# Patient Record
Sex: MALE | Race: WHITE | NOT HISPANIC OR LATINO | Employment: UNEMPLOYED | URBAN - NONMETROPOLITAN AREA
[De-identification: names, ages, dates, MRNs, and addresses within clinical notes are randomized per-mention and may not be internally consistent; named-entity substitution may affect disease eponyms.]

---

## 2023-01-20 ENCOUNTER — OFFICE VISIT (OUTPATIENT)
Dept: FAMILY MEDICINE CLINIC | Facility: CLINIC | Age: 53
End: 2023-01-20
Payer: COMMERCIAL

## 2023-01-20 VITALS
TEMPERATURE: 98 F | DIASTOLIC BLOOD PRESSURE: 98 MMHG | OXYGEN SATURATION: 97 % | HEART RATE: 78 BPM | RESPIRATION RATE: 15 BRPM | SYSTOLIC BLOOD PRESSURE: 162 MMHG | WEIGHT: 177.2 LBS | HEIGHT: 70 IN | BODY MASS INDEX: 25.37 KG/M2

## 2023-01-20 DIAGNOSIS — Z12.5 SCREENING FOR PROSTATE CANCER: ICD-10-CM

## 2023-01-20 DIAGNOSIS — R53.83 OTHER FATIGUE: ICD-10-CM

## 2023-01-20 DIAGNOSIS — Z13.1 SCREENING FOR DIABETES MELLITUS: ICD-10-CM

## 2023-01-20 DIAGNOSIS — R63.4 WEIGHT LOSS, UNINTENTIONAL: Primary | ICD-10-CM

## 2023-01-20 DIAGNOSIS — Z13.220 SCREENING FOR HYPERLIPIDEMIA: ICD-10-CM

## 2023-01-20 DIAGNOSIS — R06.02 SHORTNESS OF BREATH: ICD-10-CM

## 2023-01-20 DIAGNOSIS — Z11.59 NEED FOR HEPATITIS C SCREENING TEST: ICD-10-CM

## 2023-01-20 LAB
BILIRUB BLD-MCNC: ABNORMAL MG/DL
CLARITY, POC: CLEAR
COLOR UR: YELLOW
EXPIRATION DATE: ABNORMAL
GLUCOSE UR STRIP-MCNC: NEGATIVE MG/DL
KETONES UR QL: NEGATIVE
LEUKOCYTE EST, POC: NEGATIVE
Lab: ABNORMAL
NITRITE UR-MCNC: NEGATIVE MG/ML
PH UR: 5.5 [PH] (ref 5–8)
PROT UR STRIP-MCNC: ABNORMAL MG/DL
RBC # UR STRIP: NEGATIVE /UL
SP GR UR: 1.02 (ref 1–1.03)
UROBILINOGEN UR QL: NORMAL

## 2023-01-20 PROCEDURE — 93000 ELECTROCARDIOGRAM COMPLETE: CPT | Performed by: PHYSICIAN ASSISTANT

## 2023-01-20 PROCEDURE — 99204 OFFICE O/P NEW MOD 45 MIN: CPT | Performed by: PHYSICIAN ASSISTANT

## 2023-01-20 RX ORDER — TIOTROPIUM BROMIDE AND OLODATEROL 3.124; 2.736 UG/1; UG/1
2 SPRAY, METERED RESPIRATORY (INHALATION)
Qty: 2 G | Refills: 0 | COMMUNITY
Start: 2023-01-20 | End: 2023-02-16 | Stop reason: SDUPTHER

## 2023-01-20 NOTE — ASSESSMENT & PLAN NOTE
EKG was normal, I gave him a 2-week sample of Stiolto.  We will see if this helps his breathing and if so I will send prescription.
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
## 2023-01-20 NOTE — ASSESSMENT & PLAN NOTE
Check some basic labs and have patient follow-up next week.  There was no sugar or blood in his urine dip.

## 2023-01-21 LAB
ALBUMIN SERPL-MCNC: 4.5 G/DL (ref 3.8–4.9)
ALBUMIN/GLOB SERPL: 1.6 {RATIO} (ref 1.2–2.2)
ALP SERPL-CCNC: 68 IU/L (ref 44–121)
ALT SERPL-CCNC: 194 IU/L (ref 0–44)
AST SERPL-CCNC: 140 IU/L (ref 0–40)
BASOPHILS # BLD AUTO: 0.1 X10E3/UL (ref 0–0.2)
BASOPHILS NFR BLD AUTO: 1 %
BILIRUB SERPL-MCNC: 0.8 MG/DL (ref 0–1.2)
BUN SERPL-MCNC: 10 MG/DL (ref 6–24)
BUN/CREAT SERPL: 12 (ref 9–20)
CALCIUM SERPL-MCNC: 9.5 MG/DL (ref 8.7–10.2)
CHLORIDE SERPL-SCNC: 105 MMOL/L (ref 96–106)
CHOLEST SERPL-MCNC: 192 MG/DL (ref 100–199)
CK SERPL-CCNC: 100 U/L (ref 41–331)
CO2 SERPL-SCNC: 19 MMOL/L (ref 20–29)
CREAT SERPL-MCNC: 0.86 MG/DL (ref 0.76–1.27)
EGFRCR SERPLBLD CKD-EPI 2021: 103 ML/MIN/1.73
EOSINOPHIL # BLD AUTO: 0.1 X10E3/UL (ref 0–0.4)
EOSINOPHIL NFR BLD AUTO: 2 %
ERYTHROCYTE [DISTWIDTH] IN BLOOD BY AUTOMATED COUNT: 13.1 % (ref 11.6–15.4)
FOLATE SERPL-MCNC: 9.9 NG/ML
GLOBULIN SER CALC-MCNC: 2.9 G/DL (ref 1.5–4.5)
GLUCOSE SERPL-MCNC: 97 MG/DL (ref 70–99)
HBA1C MFR BLD: 5 % (ref 4.8–5.6)
HCT VFR BLD AUTO: 45.2 % (ref 37.5–51)
HCV AB S/CO SERPL IA: >11 S/CO RATIO (ref 0–0.9)
HDLC SERPL-MCNC: 32 MG/DL
HGB BLD-MCNC: 15.5 G/DL (ref 13–17.7)
IMM GRANULOCYTES # BLD AUTO: 0 X10E3/UL (ref 0–0.1)
IMM GRANULOCYTES NFR BLD AUTO: 0 %
LDLC SERPL CALC-MCNC: 135 MG/DL (ref 0–99)
LYMPHOCYTES # BLD AUTO: 2 X10E3/UL (ref 0.7–3.1)
LYMPHOCYTES NFR BLD AUTO: 27 %
MCH RBC QN AUTO: 32.5 PG (ref 26.6–33)
MCHC RBC AUTO-ENTMCNC: 34.3 G/DL (ref 31.5–35.7)
MCV RBC AUTO: 95 FL (ref 79–97)
MONOCYTES # BLD AUTO: 0.7 X10E3/UL (ref 0.1–0.9)
MONOCYTES NFR BLD AUTO: 10 %
NEUTROPHILS # BLD AUTO: 4.4 X10E3/UL (ref 1.4–7)
NEUTROPHILS NFR BLD AUTO: 60 %
PLATELET # BLD AUTO: 254 X10E3/UL (ref 150–450)
POTASSIUM SERPL-SCNC: 4.2 MMOL/L (ref 3.5–5.2)
PROT SERPL-MCNC: 7.4 G/DL (ref 6–8.5)
PSA SERPL-MCNC: 1.1 NG/ML (ref 0–4)
RBC # BLD AUTO: 4.77 X10E6/UL (ref 4.14–5.8)
SODIUM SERPL-SCNC: 138 MMOL/L (ref 134–144)
T4 FREE SERPL-MCNC: 1.25 NG/DL (ref 0.82–1.77)
TRIGL SERPL-MCNC: 138 MG/DL (ref 0–149)
TSH SERPL DL<=0.005 MIU/L-ACNC: 0.96 UIU/ML (ref 0.45–4.5)
VIT B12 SERPL-MCNC: 437 PG/ML (ref 232–1245)
VLDLC SERPL CALC-MCNC: 25 MG/DL (ref 5–40)
WBC # BLD AUTO: 7.2 X10E3/UL (ref 3.4–10.8)

## 2023-01-26 ENCOUNTER — OFFICE VISIT (OUTPATIENT)
Dept: FAMILY MEDICINE CLINIC | Facility: CLINIC | Age: 53
End: 2023-01-26
Payer: COMMERCIAL

## 2023-01-26 VITALS
TEMPERATURE: 97.1 F | SYSTOLIC BLOOD PRESSURE: 140 MMHG | HEIGHT: 69 IN | WEIGHT: 178.4 LBS | BODY MASS INDEX: 26.42 KG/M2 | DIASTOLIC BLOOD PRESSURE: 84 MMHG | RESPIRATION RATE: 15 BRPM | OXYGEN SATURATION: 97 % | HEART RATE: 72 BPM

## 2023-01-26 DIAGNOSIS — R76.8 POSITIVE HEPATITIS C ANTIBODY TEST: ICD-10-CM

## 2023-01-26 DIAGNOSIS — R74.8 ELEVATED LIVER ENZYMES: ICD-10-CM

## 2023-01-26 DIAGNOSIS — R11.0 NAUSEA: ICD-10-CM

## 2023-01-26 DIAGNOSIS — Z78.9 ALCOHOL USE: ICD-10-CM

## 2023-01-26 DIAGNOSIS — F41.9 ANXIETY: ICD-10-CM

## 2023-01-26 DIAGNOSIS — R63.4 WEIGHT LOSS, UNINTENTIONAL: Primary | ICD-10-CM

## 2023-01-26 PROCEDURE — 99214 OFFICE O/P EST MOD 30 MIN: CPT | Performed by: PHYSICIAN ASSISTANT

## 2023-01-26 RX ORDER — ONDANSETRON 8 MG/1
8 TABLET, ORALLY DISINTEGRATING ORAL EVERY 8 HOURS PRN
Qty: 60 TABLET | Refills: 1 | Status: SHIPPED | OUTPATIENT
Start: 2023-01-26

## 2023-01-26 RX ORDER — FAMOTIDINE 20 MG/1
20 TABLET, FILM COATED ORAL 2 TIMES DAILY
Qty: 60 TABLET | Refills: 0 | Status: SHIPPED | OUTPATIENT
Start: 2023-01-26 | End: 2023-03-17 | Stop reason: SDUPTHER

## 2023-01-26 NOTE — ASSESSMENT & PLAN NOTE
I am not sure if this is due to chronic hepatitis C infection, alcohol use or both.  Will refer to gastroenterology.

## 2023-01-26 NOTE — ASSESSMENT & PLAN NOTE
Minimal alcohol use since her last visit.  He has only had 2 beers since her last visit.  I think he is motivated to quit.  He has quit before with sustained remission of several years.

## 2023-01-26 NOTE — PROGRESS NOTES
Patient Office Visit      Patient Name: Cecil Fermin  : 1970   MRN: [id number]     Chief Complaint:    Chief Complaint   Patient presents with   • Nausea   • Unintentional weight loss   • Anxiety       History of Present Illness: Cecil Fermin is a 52 y.o. male who is here today to review lab results.  He said he is only had 2 beers since her last visit.    Subjective      Review of Systems:   Review of Systems   Gastrointestinal: Positive for nausea.   Psychiatric/Behavioral: The patient is nervous/anxious.         Past Medical History:   Past Medical History:   Diagnosis Date   • Alcoholism (HCC)    • COVID-19     X2-has had shortness of breath since last infection       Past Surgical History: History reviewed. No pertinent surgical history.    Family History:   Family History   Problem Relation Age of Onset   • Ovarian cancer Mother          age 59   • Lung cancer Father          age 47   • Lung cancer Sister    • Ovarian cancer Sister    • Pneumonia Brother          age 39 from COVID-pneumonia   • Pneumonia Brother          age 56 from COVID-pneumonia   • Pneumonia Maternal Grandmother          age 57 from COVID-pneumonia       Social History:   Social History     Socioeconomic History   • Marital status:    Tobacco Use   • Smoking status: Every Day     Packs/day: 2.00     Years: 41.00     Pack years: 82.00     Types: Cigarettes     Start date:    • Smokeless tobacco: Never   Vaping Use   • Vaping Use: Never used   Substance and Sexual Activity   • Alcohol use: Not Currently     Comment: patient is a recovering alc   • Drug use: Yes     Types: Marijuana   • Sexual activity: Not Currently       Allergies:   No Known Allergies    Objective     Physical Exam:  Vital Signs:   Vitals:    23 1016   BP: 140/84   BP Location: Right arm   Patient Position: Sitting   Cuff Size: Large Adult   Pulse: 72   Resp: 15   Temp: 97.1 °F (36.2 °C)   TempSrc: Temporal   SpO2: 97%  "  Weight: 80.9 kg (178 lb 6.4 oz)   Height: 175.3 cm (69\")     Body mass index is 26.35 kg/m².        Physical Exam  Constitutional:       General: He is not in acute distress.     Appearance: Normal appearance.   Neurological:      Mental Status: He is alert.   Psychiatric:         Mood and Affect: Mood normal.         Behavior: Behavior normal.         Thought Content: Thought content normal.         Judgment: Judgment normal.         Procedures    Assessment / Plan      Assessment/Plan:   Diagnoses and all orders for this visit:    1. Weight loss, unintentional (Primary)  Assessment & Plan:  Underlying liver disease most likely contributing.  Needs further work-up.      2. Elevated liver enzymes  Assessment & Plan:  I am not sure if this is due to chronic hepatitis C infection, alcohol use or both.  Will refer to gastroenterology.    Orders:  -     Gamma GT    3. Positive hepatitis C antibody test  Assessment & Plan:  He denies a known history of hepatitis C infection we will get a viral load.    Orders:  -     Hepatitis C RNA, Quantitative, PCR (graph)  -     Gamma GT    4. Alcohol use  Assessment & Plan:  Minimal alcohol use since her last visit.  He has only had 2 beers since her last visit.  I think he is motivated to quit.  He has quit before with sustained remission of several years.      5. Nausea  Assessment & Plan:  Zofran as needed.    Orders:  -     ondansetron ODT (ZOFRAN-ODT) 8 MG disintegrating tablet; Place 1 tablet on the tongue Every 8 (Eight) Hours As Needed for Nausea or Vomiting.  Dispense: 60 tablet; Refill: 1  -     famotidine (Pepcid) 20 MG tablet; Take 1 tablet by mouth 2 (Two) Times a Day.  Dispense: 60 tablet; Refill: 0    6. Anxiety  Assessment & Plan:  Start Zoloft and follow-up in 3 weeks.    Orders:  -     sertraline (Zoloft) 50 MG tablet; 1/2 po qd x 6 days then 1 po qd  Dispense: 30 tablet; Refill: 0         Medications:     Current Outpatient Medications:   •  tiotropium " bromide-olodaterol (Stiolto Respimat) 2.5-2.5 MCG/ACT aerosol solution inhaler, Inhale 2 puffs Daily., Disp: 2 g, Rfl: 0  •  famotidine (Pepcid) 20 MG tablet, Take 1 tablet by mouth 2 (Two) Times a Day., Disp: 60 tablet, Rfl: 0  •  ondansetron ODT (ZOFRAN-ODT) 8 MG disintegrating tablet, Place 1 tablet on the tongue Every 8 (Eight) Hours As Needed for Nausea or Vomiting., Disp: 60 tablet, Rfl: 1  •  sertraline (Zoloft) 50 MG tablet, 1/2 po qd x 6 days then 1 po qd, Disp: 30 tablet, Rfl: 0        Follow Up:   Return in about 3 weeks (around 2/16/2023) for 30 minute recheck.    Marlene Mckenzie PA-C   [facility]

## 2023-01-27 LAB — GGT SERPL-CCNC: 127 IU/L (ref 0–65)

## 2023-01-28 LAB
HCV RNA SERPL NAA+PROBE-ACNC: NORMAL IU/ML
HCV RNA SERPL NAA+PROBE-LOG IU: 5.62 LOG10 IU/ML
TEST INFORMATION: NORMAL

## 2023-02-16 ENCOUNTER — OFFICE VISIT (OUTPATIENT)
Dept: FAMILY MEDICINE CLINIC | Facility: CLINIC | Age: 53
End: 2023-02-16
Payer: COMMERCIAL

## 2023-02-16 VITALS
HEIGHT: 70 IN | BODY MASS INDEX: 25.2 KG/M2 | DIASTOLIC BLOOD PRESSURE: 102 MMHG | HEART RATE: 86 BPM | OXYGEN SATURATION: 98 % | WEIGHT: 176 LBS | RESPIRATION RATE: 15 BRPM | TEMPERATURE: 98 F | SYSTOLIC BLOOD PRESSURE: 160 MMHG

## 2023-02-16 DIAGNOSIS — Z23 ENCOUNTER FOR IMMUNIZATION: ICD-10-CM

## 2023-02-16 DIAGNOSIS — J42 CHRONIC BRONCHITIS, UNSPECIFIED CHRONIC BRONCHITIS TYPE: ICD-10-CM

## 2023-02-16 DIAGNOSIS — R74.8 ELEVATED LIVER ENZYMES: ICD-10-CM

## 2023-02-16 DIAGNOSIS — F41.1 GENERALIZED ANXIETY DISORDER: ICD-10-CM

## 2023-02-16 DIAGNOSIS — R06.02 SHORTNESS OF BREATH: ICD-10-CM

## 2023-02-16 DIAGNOSIS — Z78.9 ALCOHOL USE: ICD-10-CM

## 2023-02-16 DIAGNOSIS — I10 PRIMARY HYPERTENSION: Primary | ICD-10-CM

## 2023-02-16 DIAGNOSIS — R76.8 POSITIVE HEPATITIS C ANTIBODY TEST: ICD-10-CM

## 2023-02-16 LAB
EXPIRATION DATE: NORMAL
FLUAV AG UPPER RESP QL IA.RAPID: NOT DETECTED
FLUBV AG UPPER RESP QL IA.RAPID: NOT DETECTED
INTERNAL CONTROL: NORMAL
Lab: NORMAL
SARS-COV-2 AG UPPER RESP QL IA.RAPID: NOT DETECTED

## 2023-02-16 PROCEDURE — 90471 IMMUNIZATION ADMIN: CPT | Performed by: PHYSICIAN ASSISTANT

## 2023-02-16 PROCEDURE — 99214 OFFICE O/P EST MOD 30 MIN: CPT | Performed by: PHYSICIAN ASSISTANT

## 2023-02-16 PROCEDURE — 87428 SARSCOV & INF VIR A&B AG IA: CPT | Performed by: PHYSICIAN ASSISTANT

## 2023-02-16 PROCEDURE — 90715 TDAP VACCINE 7 YRS/> IM: CPT | Performed by: PHYSICIAN ASSISTANT

## 2023-02-16 RX ORDER — TIOTROPIUM BROMIDE AND OLODATEROL 3.124; 2.736 UG/1; UG/1
2 SPRAY, METERED RESPIRATORY (INHALATION)
Qty: 24 G | Refills: 1 | Status: SHIPPED | OUTPATIENT
Start: 2023-02-16

## 2023-02-16 RX ORDER — PROPRANOLOL HCL 60 MG
60 CAPSULE, EXTENDED RELEASE 24HR ORAL DAILY
Qty: 90 CAPSULE | Refills: 0 | Status: SHIPPED | OUTPATIENT
Start: 2023-02-16

## 2023-02-16 NOTE — ASSESSMENT & PLAN NOTE
Most likely due to chronic hepatitis C and chronic alcohol use.  Keep upcoming appointment with gastroenterology as scheduled.

## 2023-02-16 NOTE — ASSESSMENT & PLAN NOTE
Likely due to COPD and had some improvement with samples of Stiolto.  I gave him another 2-week supply of samples and sent in a prescription.  I recommend he follow-up in 2 months.

## 2023-02-16 NOTE — ASSESSMENT & PLAN NOTE
Negative rapid COVID.  He is a smoker.  I think his shortness of breath is due to COPD.  He did see some improvement on Stiolto but ran out of samples.  I gave him another 2-week sample and sent in a prescription.

## 2023-02-16 NOTE — ASSESSMENT & PLAN NOTE
He is going through some alcohol withdrawal still.  Did not tolerate sertraline.  We will start propranolol to help blood pressure and possibly his anxiety.

## 2023-02-16 NOTE — ASSESSMENT & PLAN NOTE
Has not completely stopped but is greatly improved.  I think some alcohol withdrawal still contributing to elevated blood pressure.  We will start propranolol 60 mg daily.

## 2023-02-16 NOTE — PROGRESS NOTES
Patient Office Visit      Patient Name: Cecil Fermin  : 1970   MRN: [id number]     Chief Complaint:    Chief Complaint   Patient presents with   • Hypertension   • COPD   • Anxiety   • Alcohol Problem   • Hepatitis C       History of Present Illness: Cecil Fermin is a 52 y.o. male who is here today for a follow-up visit.  I had given him a trial of Stiolto and this did help his breathing.  He has had some increased shortness of breath but denies any chest pain.  He has also had some head cold symptoms.  He has had 4 or 5 drinks since I last saw him and he has an appointment coming up in 1 month with gastroenterology to address the positive hepatitis C and elevated liver enzymes.  He tried Zoloft for anxiety and he said this made it worse so he quit taking after about 5 or 6 days.    Subjective      Review of Systems:         Past Medical History:   Past Medical History:   Diagnosis Date   • Alcoholism (HCC)    • COVID-19     X2-has had shortness of breath since last infection       Past Surgical History: History reviewed. No pertinent surgical history.    Family History:   Family History   Problem Relation Age of Onset   • Ovarian cancer Mother          age 59   • Lung cancer Father          age 47   • Lung cancer Sister    • Ovarian cancer Sister    • Pneumonia Brother          age 39 from COVID-pneumonia   • Pneumonia Brother          age 56 from COVID-pneumonia   • Pneumonia Maternal Grandmother          age 57 from COVID-pneumonia       Social History:   Social History     Socioeconomic History   • Marital status:    Tobacco Use   • Smoking status: Every Day     Packs/day: 2.00     Years: 41.00     Pack years: 82.00     Types: Cigarettes     Start date:    • Smokeless tobacco: Never   Vaping Use   • Vaping Use: Never used   Substance and Sexual Activity   • Alcohol use: Not Currently     Comment: patient is a recovering alc   • Drug use: Yes     Types: Marijuana  "  • Sexual activity: Not Currently       Allergies:   No Known Allergies    Objective     Physical Exam:  Vital Signs:   Vitals:    02/16/23 1334   BP: (!) 160/102   BP Location: Right arm   Patient Position: Sitting   Cuff Size: Large Adult   Pulse: 86   Resp: 15   Temp: 98 °F (36.7 °C)   TempSrc: Temporal   SpO2: 98%   Weight: 79.8 kg (176 lb)   Height: 177.8 cm (70\")     Body mass index is 25.25 kg/m².        Physical Exam  Constitutional:       Appearance: Normal appearance.   HENT:      Nose: Congestion present.   Cardiovascular:      Rate and Rhythm: Normal rate and regular rhythm.   Pulmonary:      Effort: Pulmonary effort is normal.      Breath sounds: Normal breath sounds.   Neurological:      Mental Status: He is alert.   Psychiatric:         Attention and Perception: Attention normal.         Mood and Affect: Mood is anxious.         Thought Content: Thought content normal.         Cognition and Memory: Cognition normal.         Judgment: Judgment normal.         Procedures    Assessment / Plan      Assessment/Plan:   Diagnoses and all orders for this visit:    1. Primary hypertension (Primary)  Assessment & Plan:  Start propranolol and follow-up in 2 months.    Orders:  -     propranolol LA (Inderal LA) 60 MG 24 hr capsule; Take 1 capsule by mouth Daily.  Dispense: 90 capsule; Refill: 0    2. Encounter for immunization  -     Tdap Vaccine Greater Than or Equal To 8yo IM    3. Chronic bronchitis, unspecified chronic bronchitis type (HCC)  Assessment & Plan:  Negative rapid COVID.  He is a smoker.  I think his shortness of breath is due to COPD.  He did see some improvement on Stiolto but ran out of samples.  I gave him another 2-week sample and sent in a prescription.    Orders:  -     tiotropium bromide-olodaterol (Stiolto Respimat) 2.5-2.5 MCG/ACT aerosol solution inhaler; Inhale 2 puffs Daily. 3 inhalers, 3 month supply  Dispense: 24 g; Refill: 1  -     POCT SARS-CoV-2 Antigen MARCUS + Flu    4. Shortness " of breath  Assessment & Plan:  Likely due to COPD and had some improvement with samples of Stiolto.  I gave him another 2-week supply of samples and sent in a prescription.  I recommend he follow-up in 2 months.    Orders:  -     POCT SARS-CoV-2 Antigen MARCUS + Flu    5. Generalized anxiety disorder  Assessment & Plan:  He is going through some alcohol withdrawal still.  Did not tolerate sertraline.  We will start propranolol to help blood pressure and possibly his anxiety.      6. Positive hepatitis C antibody test  Assessment & Plan:  Elevated viral load, keep upcoming appointment with gastroenterology.      7. Elevated liver enzymes  Assessment & Plan:  Most likely due to chronic hepatitis C and chronic alcohol use.  Keep upcoming appointment with gastroenterology as scheduled.      8. Alcohol use  Assessment & Plan:  Has not completely stopped but is greatly improved.  I think some alcohol withdrawal still contributing to elevated blood pressure.  We will start propranolol 60 mg daily.           Medications:     Current Outpatient Medications:   •  famotidine (Pepcid) 20 MG tablet, Take 1 tablet by mouth 2 (Two) Times a Day., Disp: 60 tablet, Rfl: 0  •  ondansetron ODT (ZOFRAN-ODT) 8 MG disintegrating tablet, Place 1 tablet on the tongue Every 8 (Eight) Hours As Needed for Nausea or Vomiting., Disp: 60 tablet, Rfl: 1  •  tiotropium bromide-olodaterol (Stiolto Respimat) 2.5-2.5 MCG/ACT aerosol solution inhaler, Inhale 2 puffs Daily. 3 inhalers, 3 month supply, Disp: 24 g, Rfl: 1  •  propranolol LA (Inderal LA) 60 MG 24 hr capsule, Take 1 capsule by mouth Daily., Disp: 90 capsule, Rfl: 0        Follow Up:   Return in about 2 months (around 4/16/2023) for 30 minute med recheck.    Marlene Mckenzie PA-C   [facility] [place] [facility]

## 2023-03-16 ENCOUNTER — OFFICE VISIT (OUTPATIENT)
Dept: GASTROENTEROLOGY | Facility: CLINIC | Age: 53
End: 2023-03-16
Payer: COMMERCIAL

## 2023-03-16 ENCOUNTER — LAB (OUTPATIENT)
Dept: LAB | Facility: HOSPITAL | Age: 53
End: 2023-03-16
Payer: COMMERCIAL

## 2023-03-16 VITALS
HEIGHT: 70 IN | WEIGHT: 178.8 LBS | SYSTOLIC BLOOD PRESSURE: 160 MMHG | DIASTOLIC BLOOD PRESSURE: 90 MMHG | TEMPERATURE: 98.3 F | HEART RATE: 71 BPM | OXYGEN SATURATION: 98 % | BODY MASS INDEX: 25.6 KG/M2

## 2023-03-16 DIAGNOSIS — B18.2 CHRONIC HEPATITIS C WITHOUT HEPATIC COMA: ICD-10-CM

## 2023-03-16 DIAGNOSIS — R79.89 ABNORMAL LIVER FUNCTION TESTS: ICD-10-CM

## 2023-03-16 DIAGNOSIS — Z72.0 TOBACCO ABUSE: ICD-10-CM

## 2023-03-16 DIAGNOSIS — B18.2 CHRONIC HEPATITIS C WITHOUT HEPATIC COMA: Primary | ICD-10-CM

## 2023-03-16 DIAGNOSIS — F10.10 CHRONIC ALCOHOL ABUSE: ICD-10-CM

## 2023-03-16 PROCEDURE — 87902 NFCT AGT GNTYP ALYS HEP C: CPT

## 2023-03-16 PROCEDURE — 36415 COLL VENOUS BLD VENIPUNCTURE: CPT

## 2023-03-16 PROCEDURE — 3080F DIAST BP >= 90 MM HG: CPT | Performed by: INTERNAL MEDICINE

## 2023-03-16 PROCEDURE — 1160F RVW MEDS BY RX/DR IN RCRD: CPT | Performed by: INTERNAL MEDICINE

## 2023-03-16 PROCEDURE — 86317 IMMUNOASSAY INFECTIOUS AGENT: CPT

## 2023-03-16 PROCEDURE — 1159F MED LIST DOCD IN RCRD: CPT | Performed by: INTERNAL MEDICINE

## 2023-03-16 PROCEDURE — 87340 HEPATITIS B SURFACE AG IA: CPT

## 2023-03-16 PROCEDURE — 99204 OFFICE O/P NEW MOD 45 MIN: CPT | Performed by: INTERNAL MEDICINE

## 2023-03-16 PROCEDURE — 87522 HEPATITIS C REVRS TRNSCRPJ: CPT | Performed by: INTERNAL MEDICINE

## 2023-03-16 PROCEDURE — 3077F SYST BP >= 140 MM HG: CPT | Performed by: INTERNAL MEDICINE

## 2023-03-16 PROCEDURE — 86708 HEPATITIS A ANTIBODY: CPT | Performed by: INTERNAL MEDICINE

## 2023-03-16 NOTE — PROGRESS NOTES
Patient Name: Cecil Fermin  YOB: 1970   Medical Record number: [id number]     PCP: Marlene Mckenzie PA    Chief Complaint   Patient presents with   • Elevated Hepatic Enzymes   • Hepatitis     Hep c       History of Present Illness:   HPI  I appreciate the consult for hepatitis C.  Mr. Fermin is a 52-year-old with a history of chronic alcohol use, chronic tobacco use and recent diagnosis of hepatitis C.  The patient admits to multiple tattoos been placed while in prison.  There is a history of fatigue.  He does work on a daily basis.  Mr. Fermin states that the family history of a brother having hepatitis C.  He denies any nausea or vomiting.  There is no history of change in the color of his eyes or skin.  He would drink several fifths daily but is now down to a half a pint of alcohol daily.  Mr. Fermin admits to heartburn intermittently.  There is no history of difficult or painful swallowing.  Mr. Fermin denies unexplained weight loss.  There is no history of night sweats, fever or chills.  Mr. Fermin denies any abdominal swelling.  He will occasionally have some abdominal discomfort in the right side that is not related to meals.  Past Medical History:   Diagnosis Date   • Alcoholism (HCC)    • COVID-19     X2-has had shortness of breath since last infection       History reviewed. No pertinent surgical history.      Current Outpatient Medications:   •  famotidine (Pepcid) 20 MG tablet, Take 1 tablet by mouth 2 (Two) Times a Day., Disp: 60 tablet, Rfl: 0  •  ondansetron ODT (ZOFRAN-ODT) 8 MG disintegrating tablet, Place 1 tablet on the tongue Every 8 (Eight) Hours As Needed for Nausea or Vomiting., Disp: 60 tablet, Rfl: 1  •  tiotropium bromide-olodaterol (Stiolto Respimat) 2.5-2.5 MCG/ACT aerosol solution inhaler, Inhale 2 puffs Daily. 3 inhalers, 3 month supply, Disp: 24 g, Rfl: 1  •  propranolol LA (Inderal LA) 60 MG 24 hr capsule, Take 1 capsule by mouth Daily. (Patient not taking:  Reported on 3/16/2023), Disp: 90 capsule, Rfl: 0    No Known Allergies    Family History   Problem Relation Age of Onset   • Ovarian cancer Mother          age 59   • Lung cancer Father          age 47   • Lung cancer Sister    • Ovarian cancer Sister    • Pneumonia Brother          age 39 from COVID-pneumonia   • Pneumonia Brother          age 56 from COVID-pneumonia   • Pneumonia Maternal Grandmother          age 57 from COVID-pneumonia   Sister-Lupus    Social History     Socioeconomic History   • Marital status:    Tobacco Use   • Smoking status: Every Day     Packs/day: 2.00     Years: 41.00     Pack years: 82.00     Types: Cigarettes     Start date:    • Smokeless tobacco: Never   Vaping Use   • Vaping Use: Never used   Substance and Sexual Activity   • Alcohol use: Not Currently     Comment: patient is a recovering alc- every now then   • Drug use: Yes     Types: Marijuana   • Sexual activity: Not Currently       Review of Systems   Constitutional: Positive for fatigue. Negative for activity change, appetite change, fever and unexpected weight change.   HENT: Negative for dental problem, hearing loss, mouth sores, postnasal drip, sneezing, trouble swallowing and voice change.    Eyes: Negative for pain, redness, itching and visual disturbance.   Respiratory: Negative for cough, choking, chest tightness, shortness of breath and wheezing.    Cardiovascular: Negative for chest pain, palpitations and leg swelling.   Gastrointestinal: Positive for abdominal pain. Negative for abdominal distention, anal bleeding, blood in stool, constipation, diarrhea, nausea, rectal pain and vomiting.        Heartburn   Endocrine: Negative for cold intolerance, heat intolerance, polydipsia, polyphagia and polyuria.   Genitourinary: Negative.  Negative for dysuria, enuresis, flank pain, hematuria and urgency.   Musculoskeletal: Negative for arthralgias, back pain, gait problem, joint swelling and  myalgias.   Skin: Negative for color change, pallor and rash.   Allergic/Immunologic: Negative for environmental allergies, food allergies and immunocompromised state.   Neurological: Positive for speech difficulty. Negative for dizziness, tremors, seizures, facial asymmetry, numbness and headaches.   Hematological: Negative for adenopathy.   Psychiatric/Behavioral: Negative for behavioral problems, confusion, dysphoric mood, hallucinations and self-injury.       Vitals:    03/16/23 1304   BP: 160/90   Pulse: 71   Temp: 98.3 °F (36.8 °C)   SpO2: 98%       Physical Exam  Vitals reviewed.   Constitutional:       General: He is not in acute distress.     Appearance: Normal appearance.   HENT:      Head: Normocephalic and atraumatic.      Nose: Nose normal.      Mouth/Throat:      Mouth: Mucous membranes are moist.      Pharynx: Oropharynx is clear.   Eyes:      General: No scleral icterus.     Extraocular Movements: Extraocular movements intact.   Cardiovascular:      Rate and Rhythm: Normal rate and regular rhythm.      Heart sounds: No murmur heard.  Pulmonary:      Breath sounds: Normal breath sounds. No wheezing or rales.   Abdominal:      General: Bowel sounds are normal.      Palpations: Abdomen is soft.      Tenderness: There is abdominal tenderness. There is no guarding.      Comments: Right lobe of liver felt below costal margin   Musculoskeletal:         General: No swelling. Normal range of motion.      Cervical back: Normal range of motion and neck supple.   Skin:     General: Skin is dry.      Coloration: Skin is not jaundiced.      Comments: No spider nevi, no palmar erythema   Neurological:      Mental Status: He is alert and oriented to person, place, and time.      Comments: No asterixis   Psychiatric:         Mood and Affect: Mood normal.         Thought Content: Thought content normal.         Judgment: Judgment normal.         Diagnoses and all orders for this visit:    1. Chronic hepatitis C  without hepatic coma (HCC) (Primary)  -     HCV FibroSURE  -     Hepatitis C Genotype; Future  -     Hepatitis C RNA, Quantitative, PCR (graph)  -     Hepatitis A Antibody, Total  -     Hepatitis B Surface Antigen; Future  -     Hepatitis B Surf Antibody Quant; Future  -     US Liver; Future    2. Abnormal liver function tests  -     HCV FibroSURE  -     Hepatitis C Genotype; Future  -     Hepatitis C RNA, Quantitative, PCR (graph)  -     Hepatitis A Antibody, Total  -     Hepatitis B Surface Antigen; Future  -     Hepatitis B Surf Antibody Quant; Future  -     US Liver; Future    3. Tobacco abuse    4. Chronic alcohol abuse    The patient has a history of hepatitis C and the viral load from January was less than 1 million.  The genotype is unknown.  The albumin and bilirubin are normal at this time but he does have elevated transaminases.  The platelet count is normal at this time.      Plan: Will check HCV FibroSure.           Will check hepatitis C genotype and repeat RNA.           Will check hepatitis  A and B serology.          Will schedule ultrasound of the liver with elastography.           Encouraged tobacco and alcohol cessation.

## 2023-03-17 ENCOUNTER — TELEPHONE (OUTPATIENT)
Dept: FAMILY MEDICINE CLINIC | Facility: CLINIC | Age: 53
End: 2023-03-17

## 2023-03-17 DIAGNOSIS — R11.0 NAUSEA: ICD-10-CM

## 2023-03-17 DIAGNOSIS — I10 PRIMARY HYPERTENSION: ICD-10-CM

## 2023-03-17 LAB
HAV AB SER QL IA: NEGATIVE
HBV SURFACE AB SER-ACNC: <3.1 MIU/ML
HBV SURFACE AG SERPL QL IA: NORMAL

## 2023-03-17 RX ORDER — FAMOTIDINE 20 MG/1
20 TABLET, FILM COATED ORAL 2 TIMES DAILY
Qty: 60 TABLET | Refills: 0 | Status: CANCELLED | OUTPATIENT
Start: 2023-03-17

## 2023-03-17 RX ORDER — FAMOTIDINE 20 MG/1
20 TABLET, FILM COATED ORAL 2 TIMES DAILY
Qty: 180 TABLET | Refills: 0 | Status: SHIPPED | OUTPATIENT
Start: 2023-03-17

## 2023-03-17 RX ORDER — PROPRANOLOL HCL 60 MG
60 CAPSULE, EXTENDED RELEASE 24HR ORAL DAILY
Qty: 90 CAPSULE | Refills: 0 | Status: CANCELLED | OUTPATIENT
Start: 2023-03-17

## 2023-03-17 NOTE — TELEPHONE ENCOUNTER
Caller: Cecil Fermin    Relationship: Self    Best call back number: [phone]    Requested Prescriptions:   Requested Prescriptions     Pending Prescriptions Disp Refills   • propranolol LA (Inderal LA) 60 MG 24 hr capsule 90 capsule 0     Sig: Take 1 capsule by mouth Daily.   • famotidine (Pepcid) 20 MG tablet 60 tablet 0     Sig: Take 1 tablet by mouth 2 (Two) Times a Day.        Pharmacy where request should be sent: [facility] [id number] - [place], [place] - [address] AT [address] - [phone]  - [phone] FX     Additional details provided by patient: PATIENT STATED HE WAS TOLD BY PHARMACY THAT THESE PRESCRIPTIONS WERE NEVER RECEIVED.    Does the patient have less than a 3 day supply:  [x] Yes  [] No    Would you like a call back once the refill request has been completed: [x] Yes [] No    If the office needs to give you a call back, can they leave a voicemail: [x] Yes [] No    Hiram Parham Rep   03/17/23 09:24 EDT

## 2023-03-20 LAB
HCV GENTYP SERPL NAA+PROBE: NORMAL
HCV RNA SERPL NAA+PROBE-ACNC: NORMAL IU/ML
HCV RNA SERPL NAA+PROBE-LOG IU: 5.23 LOG10 IU/ML
LABORATORY COMMENT REPORT: NORMAL
TEST INFORMATION: NORMAL

## 2023-03-21 DIAGNOSIS — B18.2 CHRONIC HEPATITIS C WITHOUT HEPATIC COMA: Primary | ICD-10-CM

## 2023-03-21 LAB
A2 MACROGLOB SERPL-MCNC: 280 MG/DL (ref 110–276)
ALT SERPL W P-5'-P-CCNC: 123 IU/L (ref 0–55)
APO A-I SERPL-MCNC: 129 MG/DL (ref 101–178)
BILIRUB SERPL-MCNC: 0.7 MG/DL (ref 0–1.2)
FIBROSIS SCORING:: ABNORMAL
FIBROSIS STAGE SERPL QL: ABNORMAL
GGT SERPL-CCNC: 117 IU/L (ref 0–65)
HAPTOGLOB SERPL-MCNC: 168 MG/DL (ref 29–370)
HCV AB SER QL: ABNORMAL
LABORATORY COMMENT REPORT: ABNORMAL
LIVER FIBR SCORE SERPL CALC.FIBROSURE: 0.61 (ref 0–0.21)
NECROINFLAMM ACTIVITY SCORING:: ABNORMAL
NECROINFLAMMATORY ACT GRADE SERPL QL: ABNORMAL
NECROINFLAMMATORY ACT SCORE SERPL: 0.75 (ref 0–0.17)
SERVICE CMNT-IMP: ABNORMAL

## 2023-03-21 RX ORDER — VELPATASVIR AND SOFOSBUVIR 100; 400 MG/1; MG/1
1 TABLET, FILM COATED ORAL DAILY
Qty: 28 TABLET | Refills: 2 | Status: SHIPPED | OUTPATIENT
Start: 2023-03-21 | End: 2023-04-19

## 2023-03-21 NOTE — TELEPHONE ENCOUNTER
I TRIED TO CALL MS YIN TO INFORM HIM THAT DR CORNEJO IS GOING TO PRESCRIBE EPCLUSA. NO ANSWER; NO VOICE MAIL SET UP.

## 2023-03-22 ENCOUNTER — PRIOR AUTHORIZATION (OUTPATIENT)
Dept: GASTROENTEROLOGY | Facility: CLINIC | Age: 53
End: 2023-03-22
Payer: COMMERCIAL

## 2023-03-22 ENCOUNTER — SPECIALTY PHARMACY (OUTPATIENT)
Dept: PHARMACY | Facility: TELEHEALTH | Age: 53
End: 2023-03-22
Payer: COMMERCIAL

## 2023-03-22 NOTE — PROGRESS NOTES
Specialty Pharmacy Patient Management Program  Initial Assessment     Cecil Fermin is a 52 y.o. male with hepatitis c and enrolled in the Patient Management program offered by [facility]. An initial outreach was conducted, including assessment of therapy appropriateness and specialty medication education for epclusa. The patient was introduced to services offered by [facility], including: regular assessments, refill coordination, curbside pick-up or mail order delivery options, prior authorization maintenance, and financial assistance programs as applicable. The patient was also provided with contact information for the pharmacy team.     Insurance Coverage & Financial Support  Pa through medicaid     Relevant Past Medical History and Comorbidities  Relevant medical history and concomitant health conditions were discussed with the patient. The patient's chart has been reviewed for relevant past medical history and comorbid health conditions and updated as necessary.   Past Medical History:   Diagnosis Date   • Alcoholism (HCC)    • COVID-19     X2-has had shortness of breath since last infection     Social History     Socioeconomic History   • Marital status:    Tobacco Use   • Smoking status: Every Day     Packs/day: 2.00     Years: 41.00     Pack years: 82.00     Types: Cigarettes     Start date: 1982   • Smokeless tobacco: Never   Vaping Use   • Vaping Use: Never used   Substance and Sexual Activity   • Alcohol use: Not Currently     Comment: patient is a recovering alc- every now then   • Drug use: Yes     Types: Marijuana   • Sexual activity: Not Currently       Allergies  Known allergies and reactions were discussed with the patient. The patient's chart has been reviewed for allergy information and updated as necessary.   Patient has no known allergies.    Current Medication List  This medication list has been reviewed with the patient and evaluated for  any interactions or necessary modifications/recommendations, and updated to include all prescription medications, OTC medications, and supplements the patient is currently taking. This list reflects what is contained in the patient's profile, which has also been marked as reviewed to communicate to other providers it is the most up to date version of the patient's current medication therapy.     Current Outpatient Medications:   •  famotidine (Pepcid) 20 MG tablet, Take 1 tablet by mouth 2 (Two) Times a Day., Disp: 180 tablet, Rfl: 0  •  ondansetron ODT (ZOFRAN-ODT) 8 MG disintegrating tablet, Place 1 tablet on the tongue Every 8 (Eight) Hours As Needed for Nausea or Vomiting., Disp: 60 tablet, Rfl: 1  •  propranolol LA (Inderal LA) 60 MG 24 hr capsule, Take 1 capsule by mouth Daily., Disp: 90 capsule, Rfl: 0  •  Sofosbuvir-Velpatasvir (Epclusa) 400-100 MG tablet, Take 1 tablet by mouth Daily., Disp: 28 tablet, Rfl: 2  •  tiotropium bromide-olodaterol (Stiolto Respimat) 2.5-2.5 MCG/ACT aerosol solution inhaler, Inhale 2 puffs Daily. 3 inhalers, 3 month supply, Disp: 24 g, Rfl: 1    Drug Interactions  none     Relevant Laboratory Values  Lab Results   Component Value Date    GLUCOSE 97 01/20/2023    CALCIUM 9.5 01/20/2023     01/20/2023    K 4.2 01/20/2023    CO2 19 (L) 01/20/2023     01/20/2023    BUN 10 01/20/2023    CREATININE 0.86 01/20/2023    EGFRRESULT 103 01/20/2023    BCR 12 01/20/2023     Lab Results   Component Value Date    WBC 7.2 01/20/2023    HGB 15.5 01/20/2023    HCT 45.2 01/20/2023    MCV 95 01/20/2023     01/20/2023       Initial Education Provided for Specialty Medication  The patient has been provided with the following education and any applicable administration techniques (i.e. self-injection) have been demonstrated for the therapies indicated. All questions and concerns have been addressed prior to the patient receiving the medication, and the patient has verbalized  understanding of the education and any materials provided. Additional patient education shall be provided and documented upon request by the patient, provider or payer.      Epclusa (sofosbuvir/velpatasvir)         Medication Expectations   Why am I taking this medication? This is indicated for patients with hepatitis C virus (HCV) genotypes 1-6 without cirrhosis, genotypes 1, 2, 4-6 with compensated cirrhosis, or genotype 3 with compensated cirrhosis if RITU Y93H is negative. It is also indicated for patients with genotypes 1-6 with decompensated cirrhosis, however this will require longer treatment and/or the addition of ribavirin.   What should I expect while on this medication? There is a > 90% cure rate of hepatitis C with completed treatment.   How does the medication work? Sofosbuvir is an inhibitor of HCV NS5B protease, necessary for replication of the virus.    Velpatasvir is an inhibitor of HCV NS5A, essential for viral replication and assembly.   How long will I be on this medication for? You will be on this medication for 12 weeks.   How do I take this medication? Sofosbuvir/velpatasvir is 1 tablet taken at the same time each day with or without food.   What are some possible side effects? The most common side effects are headache, fatigue, nausea, insomnia, and common cold symptoms.   What happens if I miss a dose? If it has been less than 18 hours, take the missed dose as soon as you can. Take your next dose at the usual time.  If it has been more than 18 hours, do not take your missed dose, take your next dose as usual.            Medication Safety   What are things I should warn my doctor immediately about? Allergic reaction (itching or hives, swelling in your face or hands, swelling or tingling in your mouth or throat, chest tightness, or trouble breathing); signs of liver failure including dark urine, pale stools, nausea, vomiting, loss of appetite, stomach pain, yellow skin or eyes.   What are  things that I should be cautious of? Headache, nausea, tiredness or weakness.   What are some medications that can interact with this one? Some medicines can affect how sofosbuvir/velpatasvir works. Tell your doctor if you are using any of the following:  • Rifampin, rifabutin, rifapentin, carbamazepine, oxcarbazepine, phenytoin, phenobarbital, Geraldine's wort, or amiodarone   • Medicine to treat HIV infection (including tipranavir, efavirenz, or ritonavir)  If you are taking a PPI, your therapy may need to be temporarily discontinued. If PPI therapy is necessary, omeprazole 20 mg is preferred and should be taken at least 4 hours after sofosbuvir/velpatasvir.            Medication Storage/Handling   How should I handle this medication? Keep this medication out of reach of pets/children in original container.     How does this medication need to be stored? Store at room temperature.   How should I dispose of this medication? You should not have any medication left over. If you do reach out to your pharmacist of doctor.            Resources/Support   How can I remind myself to take this medication? You can download a reminder jud on your phone or use a calendar to help with your once daily reminder.   Is financial support available?  Yes, [employer] can provide co-pay cards if you have commercial insurance or patient assistance if you have Medicare or no insurance.    Which vaccines are recommended for me? Talk with your doctor about the hepatitis B vaccine.           Adherence and Self-Administration  • Barriers to Patient Adherence and/or Self-Administration: none   • Methods for Supporting Patient Adherence and/or Self-Administration: N/A     Goals of Therapy  • Patient Goals of Therapy: take 1 tablet every day for 12 weeks  • Clinical Goals or Therapeutic Targets, If Applicable: cure patient of hepatitis c after 12 weeks     Reassessment Plan & Follow-Up  1. Medication Therapy Changes: starting epclusa  2. Additional  Plans, Therapy Recommendations, or Therapy Problems to Be Addressed: none   3. Pharmacist to perform regular reassessments no more than (6) months from the previous assessment.  4. Welcome information and patient satisfaction survey to be sent by retail team with patient's initial fill.  5. Care Coordinator to set up future refill outreaches, coordinate prescription delivery, and escalate clinical questions to pharmacist.     Attestation  I attest that the initiated specialty medication(s) are appropriate for the patient based on my assessment. If the prescribed therapy is at any point deemed not appropriate based on the current or future assessments, a consultation will be initiated with the patient's specialty care provider to determine the best course of action. The revised plan of therapy will be documented along with any additional patient education provided.     Electronically signed by Jacobo Ward RPH, 03/22/23, 10:53 AM EDT.

## 2023-04-14 ENCOUNTER — SPECIALTY PHARMACY (OUTPATIENT)
Dept: PHARMACY | Facility: TELEHEALTH | Age: 53
End: 2023-04-14
Payer: COMMERCIAL

## 2023-04-14 NOTE — PROGRESS NOTES
Specialty Pharmacy Patient Management Program  Call Center Refill Outreach      Cecil is a 52 y.o. male contacted today regarding refills of his medication(s).    Specialty medication(s) and dose(s) confirmed: Epclusa 400-100  Other medications being refilled: none    Refill Questions    Flowsheet Row Most Recent Value   Changes to allergies? No   Changes to medications? No   New conditions since last clinic visit No   Unplanned office visit, urgent care, ED, or hospital admission in the last 4 weeks  No   How does patient/caregiver feel medication is working? Very good   Financial problems or insurance changes  No   Since the previous refill, were any specialty medication doses or scheduled injections missed or delayed?  No   Does this patient require a clinical escalation to a pharmacist? No                     Follow-up: 21 days     Jacobo Ward [facility]  Specialty Pharmacist  4/14/2023  09:30 EDT

## 2023-04-14 NOTE — PROGRESS NOTES
Specialty Pharmacy Patient Management Program  Reassessment     Cecil Fermin is a 52 y.o. male with Hepatitis C and enrolled in the Patient Management program offered by [facility]. A follow-up outreach was conducted, including assessment of continued therapy appropriateness, medication adherence, and side effect incidence and management for Epclusa.     Changes to Insurance Coverage or Financial Support  none    Relevant Past Medical History and Comorbidities  Relevant medical history and concomitant health conditions were discussed with the patient. The patient's chart has been reviewed for relevant past medical history and comorbid health conditions and updated as necessary.   Past Medical History:   Diagnosis Date   • Alcoholism    • COVID-19     X2-has had shortness of breath since last infection     Social History     Socioeconomic History   • Marital status:    Tobacco Use   • Smoking status: Every Day     Packs/day: 2.00     Years: 41.00     Pack years: 82.00     Types: Cigarettes     Start date: 1982   • Smokeless tobacco: Never   Vaping Use   • Vaping Use: Never used   Substance and Sexual Activity   • Alcohol use: Not Currently     Comment: patient is a recovering alc- every now then   • Drug use: Yes     Types: Marijuana   • Sexual activity: Not Currently       Allergies  Known allergies and reactions were discussed with the patient. The patient's chart has been reviewed for allergy information and updated as necessary.   Patient has no known allergies.    Relevant Laboratory Values  Lab Results   Component Value Date    GLUCOSE 97 01/20/2023    CALCIUM 9.5 01/20/2023     01/20/2023    K 4.2 01/20/2023    CO2 19 (L) 01/20/2023     01/20/2023    BUN 10 01/20/2023    CREATININE 0.86 01/20/2023    EGFRRESULT 103 01/20/2023    BCR 12 01/20/2023     Lab Results   Component Value Date    WBC 7.2 01/20/2023    HGB 15.5 01/20/2023    HCT 45.2 01/20/2023    MCV 95  01/20/2023     01/20/2023       Current Medication List  This medication list has been reviewed with the patient and evaluated for any interactions or necessary modifications/recommendations, and updated to include all prescription medications, OTC medications, and supplements the patient is currently taking. This list reflects what is contained in the patient's profile, which has also been marked as reviewed to communicate to other providers it is the most up to date version of the patient's current medication therapy.     Current Outpatient Medications:   •  famotidine (Pepcid) 20 MG tablet, Take 1 tablet by mouth 2 (Two) Times a Day., Disp: 180 tablet, Rfl: 0  •  ondansetron ODT (ZOFRAN-ODT) 8 MG disintegrating tablet, Place 1 tablet on the tongue Every 8 (Eight) Hours As Needed for Nausea or Vomiting., Disp: 60 tablet, Rfl: 1  •  propranolol LA (Inderal LA) 60 MG 24 hr capsule, Take 1 capsule by mouth Daily., Disp: 90 capsule, Rfl: 0  •  Sofosbuvir-Velpatasvir (Epclusa) 400-100 MG tablet, Take 1 tablet by mouth Daily., Disp: 28 tablet, Rfl: 2  •  tiotropium bromide-olodaterol (Stiolto Respimat) 2.5-2.5 MCG/ACT aerosol solution inhaler, Inhale 2 puffs Daily. 3 inhalers, 3 month supply, Disp: 24 g, Rfl: 1    Drug Interactions  none     Adverse Drug Reactions  • Adverse Reactions Experienced: Headache  • Plan for ADR Management: taking medication at night and patient said he sleeps through the headache and it gone by the morning    Hospitalizations and Urgent Care Since Last Assessment  • Hospitalizations or Admissions: none  • ED Visits: none  • Urgent Office Visits: none     Adherence and Self-Administration  • Approximate Number of Doses Missed Since Last Assessment: none  • Ongoing or New Barriers to Patient Adherence and/or Self-Administration: none   • Methods for Supporting Patient Adherence and/or Self-Administration: N/A     Goals of Therapy  • Progress Toward Meeting Patient-Identified Goals of  Therapy: On Track  o New Patient-Identified Goals, If Applicable:     • Progress Toward Meeting Clinical Goals or Therapeutic Targets: On Track  o New Clinical Goals or Therapeutic Targets, If Applicable:     Quality of Life Assessment   Quality of Life related to the patient's specialty therapy was discussed with the patient. The QOL segment of this outreach has been reviewed and updated.   Quality of Life Assessment  Quality of Life Improvement Scale: No change  Comments on Quality of Life: has a little headache but not bad    Reassessment Plan & Follow-Up  1. Medication Therapy Changes: none  2. Additional Plans, Therapy Recommendations, or Therapy Problems to Be Addressed: none   3. Patient reports having headache but states its not to bad and will continue to take medication  4. Pharmacist to perform regular reassessments no more than (6) months from the previous assessment.  5. Care Coordinator to set up future refill outreaches, coordinate prescription delivery, and escalate clinical questions to pharmacist.     Attestation  I attest that the specialty medication(s) addressed above are appropriate for the patient based on my reassessment. If the prescribed therapy is at any point deemed not appropriate based on the current or future assessments, a consultation will be initiated with the patient's specialty care provider to determine the best course of action. The revised plan of therapy will be documented along with any additional patient education provided.     Electronically signed by Jacobo Ward RPH, 04/14/23, 9:27 AM EDT.

## 2023-04-16 ENCOUNTER — HOSPITAL ENCOUNTER (OUTPATIENT)
Dept: ULTRASOUND IMAGING | Facility: HOSPITAL | Age: 53
Discharge: HOME | End: 2023-04-16
Admitting: INTERNAL MEDICINE
Payer: COMMERCIAL

## 2023-04-16 DIAGNOSIS — R79.89 ABNORMAL LIVER FUNCTION TESTS: ICD-10-CM

## 2023-04-16 DIAGNOSIS — B18.2 CHRONIC HEPATITIS C WITHOUT HEPATIC COMA: ICD-10-CM

## 2023-04-16 PROCEDURE — 76981 USE PARENCHYMA: CPT

## 2023-04-16 PROCEDURE — 76705 ECHO EXAM OF ABDOMEN: CPT

## 2023-04-17 ENCOUNTER — OFFICE VISIT (OUTPATIENT)
Dept: FAMILY MEDICINE CLINIC | Facility: CLINIC | Age: 53
End: 2023-04-17
Payer: COMMERCIAL

## 2023-04-17 VITALS
DIASTOLIC BLOOD PRESSURE: 90 MMHG | OXYGEN SATURATION: 96 % | HEIGHT: 70 IN | HEART RATE: 80 BPM | SYSTOLIC BLOOD PRESSURE: 148 MMHG | WEIGHT: 182.4 LBS | BODY MASS INDEX: 26.11 KG/M2

## 2023-04-17 DIAGNOSIS — I10 PRIMARY HYPERTENSION: Primary | ICD-10-CM

## 2023-04-17 DIAGNOSIS — Z78.9 ALCOHOL USE: ICD-10-CM

## 2023-04-17 DIAGNOSIS — J42 CHRONIC BRONCHITIS, UNSPECIFIED CHRONIC BRONCHITIS TYPE: ICD-10-CM

## 2023-04-17 DIAGNOSIS — M25.541 ARTHRALGIA OF BOTH HANDS: ICD-10-CM

## 2023-04-17 DIAGNOSIS — M25.542 ARTHRALGIA OF BOTH HANDS: ICD-10-CM

## 2023-04-17 PROBLEM — B18.2 CHRONIC HEPATITIS C VIRUS INFECTION: Status: ACTIVE | Noted: 2023-01-26

## 2023-04-17 PROCEDURE — 3077F SYST BP >= 140 MM HG: CPT | Performed by: PHYSICIAN ASSISTANT

## 2023-04-17 PROCEDURE — 1159F MED LIST DOCD IN RCRD: CPT | Performed by: PHYSICIAN ASSISTANT

## 2023-04-17 PROCEDURE — 3080F DIAST BP >= 90 MM HG: CPT | Performed by: PHYSICIAN ASSISTANT

## 2023-04-17 PROCEDURE — 1160F RVW MEDS BY RX/DR IN RCRD: CPT | Performed by: PHYSICIAN ASSISTANT

## 2023-04-17 RX ORDER — PROPRANOLOL HYDROCHLORIDE 80 MG/1
80 CAPSULE, EXTENDED RELEASE ORAL DAILY
Qty: 90 CAPSULE | Refills: 1 | Status: SHIPPED | OUTPATIENT
Start: 2023-04-17

## 2023-04-17 NOTE — ASSESSMENT & PLAN NOTE
He has significantly decreased his alcohol intake.  I recommend continuing to decrease with the goal of abstinence.

## 2023-04-17 NOTE — PROGRESS NOTES
Patient Office Visit      Patient Name: Cecil Fermin  : 1970   MRN: [id number]     Chief Complaint:    Chief Complaint   Patient presents with   • Hypertension   • COPD       History of Present Illness: Cecil Fermin is a 53 y.o. male who is here today to follow-up on his blood pressure and COPD.  He is undergoing active hepatitis C treatment.  He has significantly decreased his alcohol intake.  He complains of swelling and stiffness in his hands.  He said there is a family history of rheumatoid arthritis.  The inhaler is really helping his breathing.    Subjective      Review of Systems:         Past Medical History:   Past Medical History:   Diagnosis Date   • Alcoholism    • COVID-19     X2-has had shortness of breath since last infection       Past Surgical History: History reviewed. No pertinent surgical history.    Family History:   Family History   Problem Relation Age of Onset   • Ovarian cancer Mother          age 59   • Lung cancer Father          age 47   • Lung cancer Sister    • Ovarian cancer Sister    • Pneumonia Brother          age 39 from COVID-pneumonia   • Pneumonia Brother          age 56 from COVID-pneumonia   • Pneumonia Maternal Grandmother          age 57 from COVID-pneumonia       Social History:   Social History     Socioeconomic History   • Marital status:    Tobacco Use   • Smoking status: Every Day     Packs/day: 2.00     Years: 41.00     Pack years: 82.00     Types: Cigarettes     Start date:    • Smokeless tobacco: Never   Vaping Use   • Vaping Use: Never used   Substance and Sexual Activity   • Alcohol use: Not Currently     Comment: patient is a recovering alc- every now then   • Drug use: Yes     Types: Marijuana   • Sexual activity: Not Currently       Allergies:   No Known Allergies    Objective     Physical Exam:  Vital Signs:   Vitals:    23 1300   BP: 148/90   BP Location: Left arm   Patient Position: Sitting   Cuff Size:  "Large Adult   Pulse: 80   SpO2: 96%   Weight: 82.7 kg (182 lb 6.4 oz)   Height: 177.8 cm (70\")   PainSc:   6   PainLoc: Hand     Body mass index is 26.17 kg/m².        Physical Exam  Constitutional:       General: He is not in acute distress.     Appearance: Normal appearance.   Neurological:      Mental Status: He is alert.   Psychiatric:         Mood and Affect: Mood normal.         Behavior: Behavior normal.         Thought Content: Thought content normal.         Judgment: Judgment normal.         Procedures    Assessment / Plan      Assessment/Plan:   Diagnoses and all orders for this visit:    1. Primary hypertension (Primary)  Assessment & Plan:  Hypertension is improving with treatment.  Medication changes per orders.  Blood pressure will be reassessed 2 months.  Increase propranolol from 60 mg to 80 mg.    Orders:  -     propranolol LA (Inderal LA) 80 MG 24 hr capsule; Take 1 capsule by mouth Daily.  Dispense: 90 capsule; Refill: 1    2. Chronic bronchitis, unspecified chronic bronchitis type    3. Arthralgia of both hands  Assessment & Plan:  Possible RA.  I recommend waiting until he is finished with his hepatitis C treatment before we evaluate.  He is agreeable.  If hand swelling, stiffness, pain continues we will evaluate for inflammatory arthritis.      4. Alcohol use  Assessment & Plan:  He has significantly decreased his alcohol intake.  I recommend continuing to decrease with the goal of abstinence.         Medications:     Current Outpatient Medications:   •  ondansetron ODT (ZOFRAN-ODT) 8 MG disintegrating tablet, Place 1 tablet on the tongue Every 8 (Eight) Hours As Needed for Nausea or Vomiting., Disp: 60 tablet, Rfl: 1  •  propranolol LA (Inderal LA) 80 MG 24 hr capsule, Take 1 capsule by mouth Daily., Disp: 90 capsule, Rfl: 1  •  Sofosbuvir-Velpatasvir (Epclusa) 400-100 MG tablet, Take 1 tablet by mouth Daily., Disp: 28 tablet, Rfl: 2  •  tiotropium bromide-olodaterol (Stiolto Respimat) " 2.5-2.5 MCG/ACT aerosol solution inhaler, Inhale 2 puffs Daily. 3 inhalers, 3 month supply, Disp: 24 g, Rfl: 1        Follow Up:   Return in about 2 months (around 6/17/2023) for Recheck.    Marlene Mckenzie PA-C   [facility]

## 2023-04-17 NOTE — ASSESSMENT & PLAN NOTE
Hypertension is improving with treatment.  Medication changes per orders.  Blood pressure will be reassessed 2 months.  Increase propranolol from 60 mg to 80 mg.

## 2023-04-17 NOTE — ASSESSMENT & PLAN NOTE
Possible RA.  I recommend waiting until he is finished with his hepatitis C treatment before we evaluate.  He is agreeable.  If hand swelling, stiffness, pain continues we will evaluate for inflammatory arthritis.

## 2023-04-18 ENCOUNTER — TELEPHONE (OUTPATIENT)
Dept: GASTROENTEROLOGY | Facility: CLINIC | Age: 53
End: 2023-04-18
Payer: COMMERCIAL

## 2023-04-18 NOTE — TELEPHONE ENCOUNTER
----- Message from Aguila Harrison MD sent at 4/18/2023  9:02 AM EDT -----  The ultrasound demonstrated fatty liver.  I wanted to get him started on Epclusa but it appears he did not return the phone call.

## 2023-04-20 DIAGNOSIS — B18.2 CHRONIC HEPATITIS C WITHOUT HEPATIC COMA: Primary | ICD-10-CM

## 2023-04-24 DIAGNOSIS — B19.20 HEPATITIS C VIRUS INFECTION WITHOUT HEPATIC COMA, UNSPECIFIED CHRONICITY: Primary | ICD-10-CM

## 2023-05-09 ENCOUNTER — SPECIALTY PHARMACY (OUTPATIENT)
Dept: PHARMACY | Facility: TELEHEALTH | Age: 53
End: 2023-05-09
Payer: COMMERCIAL

## 2023-05-09 ENCOUNTER — LAB (OUTPATIENT)
Dept: FAMILY MEDICINE CLINIC | Facility: CLINIC | Age: 53
End: 2023-05-09
Payer: COMMERCIAL

## 2023-05-09 NOTE — PROGRESS NOTES
Specialty Pharmacy Patient Management Program  Reassessment     Cecil Fermin is a 53 y.o. male with hepatitis C and enrolled in the Patient Management program offered by [facility]. A follow-up outreach was conducted, including assessment of continued therapy appropriateness, medication adherence, and side effect incidence and management for Epclusa 400-100.     Changes to Insurance Coverage or Financial Support  none    Relevant Past Medical History and Comorbidities  Relevant medical history and concomitant health conditions were discussed with the patient. The patient's chart has been reviewed for relevant past medical history and comorbid health conditions and updated as necessary.   Past Medical History:   Diagnosis Date   • Alcoholism    • COVID-19     X2-has had shortness of breath since last infection     Social History     Socioeconomic History   • Marital status:    Tobacco Use   • Smoking status: Every Day     Packs/day: 2.00     Years: 41.00     Pack years: 82.00     Types: Cigarettes     Start date: 1982   • Smokeless tobacco: Never   Vaping Use   • Vaping Use: Never used   Substance and Sexual Activity   • Alcohol use: Not Currently     Comment: patient is a recovering alc- every now then   • Drug use: Yes     Types: Marijuana   • Sexual activity: Not Currently       Allergies  Known allergies and reactions were discussed with the patient. The patient's chart has been reviewed for allergy information and updated as necessary.   Patient has no known allergies.    Relevant Laboratory Values  Lab Results   Component Value Date    GLUCOSE 97 01/20/2023    CALCIUM 9.5 01/20/2023     01/20/2023    K 4.2 01/20/2023    CO2 19 (L) 01/20/2023     01/20/2023    BUN 10 01/20/2023    CREATININE 0.86 01/20/2023    EGFRRESULT 103 01/20/2023    BCR 12 01/20/2023     Lab Results   Component Value Date    WBC 7.2 01/20/2023    HGB 15.5 01/20/2023    HCT 45.2 01/20/2023     MCV 95 01/20/2023     01/20/2023       Current Medication List  This medication list has been reviewed with the patient and evaluated for any interactions or necessary modifications/recommendations, and updated to include all prescription medications, OTC medications, and supplements the patient is currently taking. This list reflects what is contained in the patient's profile, which has also been marked as reviewed to communicate to other providers it is the most up to date version of the patient's current medication therapy.     Current Outpatient Medications:   •  ondansetron ODT (ZOFRAN-ODT) 8 MG disintegrating tablet, Place 1 tablet on the tongue Every 8 (Eight) Hours As Needed for Nausea or Vomiting., Disp: 60 tablet, Rfl: 1  •  propranolol LA (Inderal LA) 80 MG 24 hr capsule, Take 1 capsule by mouth Daily., Disp: 90 capsule, Rfl: 1  •  Sofosbuvir-Velpatasvir (Epclusa) 400-100 MG tablet, Take 1 tablet by mouth Daily., Disp: 28 tablet, Rfl: 2  •  tiotropium bromide-olodaterol (Stiolto Respimat) 2.5-2.5 MCG/ACT aerosol solution inhaler, Inhale 2 puffs Daily. 3 inhalers, 3 month supply, Disp: 24 g, Rfl: 1    Drug Interactions  none     Adverse Drug Reactions  • Adverse Reactions Experienced: none  • Plan for ADR Management: N/A     Hospitalizations and Urgent Care Since Last Assessment  • Hospitalizations or Admissions: none  • ED Visits: none  • Urgent Office Visits: none     Adherence and Self-Administration  • Approximate Number of Doses Missed Since Last Assessment: none  • Ongoing or New Barriers to Patient Adherence and/or Self-Administration: none   • Methods for Supporting Patient Adherence and/or Self-Administration: N/A     Goals of Therapy  Goals related to the patient's specialty therapy were discussed with the patient. The Patient Goals segment of this outreach has been reviewed and updated.   Goals     • Specialty Pharmacy General Goal      Cure patient of hepatitis c after 12 weeks of  therapy and maintain adherence above 90%            • Progress Toward Meeting Patient-Identified Goals of Therapy: On Track  o New Patient-Identified Goals, If Applicable:     • Progress Toward Meeting Clinical Goals or Therapeutic Targets: On Track  o New Clinical Goals or Therapeutic Targets, If Applicable:     Quality of Life Assessment   Quality of Life related to the patient's specialty therapy was discussed with the patient. The QOL segment of this outreach has been reviewed and updated.   Quality of Life Assessment  Quality of Life Improvement Scale: No change  Comments on Quality of Life: tolerating well    Reassessment Plan & Follow-Up  1. Medication Therapy Changes: none  2. Additional Plans, Therapy Recommendations, or Therapy Problems to Be Addressed: none   3. Patient states that headache has resolved and no ADRs to report at this time.  4. Will follow up once 12 week therapy is complaint and paints cheek labs.   5. Pharmacist to perform regular reassessments no more than (6) months from the previous assessment.  6. Care Coordinator to set up future refill outreaches, coordinate prescription delivery, and escalate clinical questions to pharmacist.     Attestation  I attest that the specialty medication(s) addressed above are appropriate for the patient based on my reassessment. If the prescribed therapy is at any point deemed not appropriate based on the current or future assessments, a consultation will be initiated with the patient's specialty care provider to determine the best course of action. The revised plan of therapy will be documented along with any additional patient education provided.     Electronically signed by Jacobo Ward RPH, 05/09/23, 9:55 AM EDT.

## 2023-05-09 NOTE — PROGRESS NOTES
Specialty Pharmacy Patient Management Program  Call Center Refill Outreach      Cecil is a 53 y.o. male contacted today regarding refills of his medication(s).    Specialty medication(s) and dose(s) confirmed: Epclusa 400-100  Other medications being refilled: none    Refill Questions    Flowsheet Row Most Recent Value   Changes to allergies? No   Changes to medications? No   New conditions since last clinic visit No   Unplanned office visit, urgent care, ED, or hospital admission in the last 4 weeks  No   How does patient/caregiver feel medication is working? Very good   Financial problems or insurance changes  No   Since the previous refill, were any specialty medication doses or scheduled injections missed or delayed?  No   Does this patient require a clinical escalation to a pharmacist? No                     Follow-up: last fill of 12 week therapy. Will follow up at end of 12 week therapy      Jacobo Ward [facility]  Specialty Pharmacist  5/9/2023  09:58 EDT

## 2023-05-10 LAB
ALBUMIN SERPL-MCNC: 4.5 G/DL (ref 3.8–4.9)
ALBUMIN/GLOB SERPL: 1.6 {RATIO} (ref 1.2–2.2)
ALP SERPL-CCNC: 62 IU/L (ref 44–121)
ALT SERPL-CCNC: 19 IU/L (ref 0–44)
AST SERPL-CCNC: 26 IU/L (ref 0–40)
BILIRUB SERPL-MCNC: 0.9 MG/DL (ref 0–1.2)
BUN SERPL-MCNC: 15 MG/DL (ref 6–24)
BUN/CREAT SERPL: 13 (ref 9–20)
CALCIUM SERPL-MCNC: 9.8 MG/DL (ref 8.7–10.2)
CHLORIDE SERPL-SCNC: 103 MMOL/L (ref 96–106)
CO2 SERPL-SCNC: 19 MMOL/L (ref 20–29)
CREAT SERPL-MCNC: 1.15 MG/DL (ref 0.76–1.27)
EGFRCR SERPLBLD CKD-EPI 2021: 75 ML/MIN/1.73
GLOBULIN SER CALC-MCNC: 2.9 G/DL (ref 1.5–4.5)
GLUCOSE SERPL-MCNC: 118 MG/DL (ref 70–99)
POTASSIUM SERPL-SCNC: 3.9 MMOL/L (ref 3.5–5.2)
PROT SERPL-MCNC: 7.4 G/DL (ref 6–8.5)
SODIUM SERPL-SCNC: 138 MMOL/L (ref 134–144)

## 2023-05-11 LAB
HCV RNA SERPL NAA+PROBE-ACNC: NORMAL IU/ML
TEST INFORMATION: NORMAL

## 2023-05-12 ENCOUNTER — TELEPHONE (OUTPATIENT)
Dept: GASTROENTEROLOGY | Facility: CLINIC | Age: 53
End: 2023-05-12
Payer: COMMERCIAL

## 2023-05-12 NOTE — TELEPHONE ENCOUNTER
I called but the voicemail for the patient had not been set up as of yet.  I wanted to let him know that the virus has been eradicated.

## 2023-06-06 ENCOUNTER — SPECIALTY PHARMACY (OUTPATIENT)
Dept: PHARMACY | Facility: TELEHEALTH | Age: 53
End: 2023-06-06
Payer: COMMERCIAL

## 2023-06-06 NOTE — PROGRESS NOTES
Specialty Pharmacy Patient Management Program  Reassessment     Cecil Fermin is a 53 y.o. male with Hepatitis C and enrolled in the Patient Management program offered by [facility]. A follow-up outreach was conducted, including assessment of continued therapy appropriateness, medication adherence, and side effect incidence and management for Epclusa 400-100.     Changes to Insurance Coverage or Financial Support  none    Relevant Past Medical History and Comorbidities  Relevant medical history and concomitant health conditions were discussed with the patient. The patient's chart has been reviewed for relevant past medical history and comorbid health conditions and updated as necessary.   Past Medical History:   Diagnosis Date    Alcoholism     COVID-19     X2-has had shortness of breath since last infection     Social History     Socioeconomic History    Marital status:    Tobacco Use    Smoking status: Every Day     Packs/day: 2.00     Years: 41.00     Pack years: 82.00     Types: Cigarettes     Start date: 1982    Smokeless tobacco: Never   Vaping Use    Vaping Use: Never used   Substance and Sexual Activity    Alcohol use: Not Currently     Comment: patient is a recovering alc- every now then    Drug use: Yes     Types: Marijuana    Sexual activity: Not Currently       Allergies  Known allergies and reactions were discussed with the patient. The patient's chart has been reviewed for allergy information and updated as necessary.   Patient has no known allergies.    Relevant Laboratory Values  Lab Results   Component Value Date    GLUCOSE 118 (H) 05/09/2023    CALCIUM 9.8 05/09/2023     05/09/2023    K 3.9 05/09/2023    CO2 19 (L) 05/09/2023     05/09/2023    BUN 15 05/09/2023    CREATININE 1.15 05/09/2023    EGFRRESULT 75 05/09/2023    BCR 13 05/09/2023     Lab Results   Component Value Date    WBC 7.2 01/20/2023    HGB 15.5 01/20/2023    HCT 45.2 01/20/2023    MCV 95  01/20/2023     01/20/2023       Current Medication List  This medication list has been reviewed with the patient and evaluated for any interactions or necessary modifications/recommendations, and updated to include all prescription medications, OTC medications, and supplements the patient is currently taking. This list reflects what is contained in the patient's profile, which has also been marked as reviewed to communicate to other providers it is the most up to date version of the patient's current medication therapy.     Current Outpatient Medications:     ondansetron ODT (ZOFRAN-ODT) 8 MG disintegrating tablet, Place 1 tablet on the tongue Every 8 (Eight) Hours As Needed for Nausea or Vomiting., Disp: 60 tablet, Rfl: 1    propranolol LA (Inderal LA) 80 MG 24 hr capsule, Take 1 capsule by mouth Daily., Disp: 90 capsule, Rfl: 1    Sofosbuvir-Velpatasvir (Epclusa) 400-100 MG tablet, Take 1 tablet by mouth Daily., Disp: 28 tablet, Rfl: 2    tiotropium bromide-olodaterol (Stiolto Respimat) 2.5-2.5 MCG/ACT aerosol solution inhaler, Inhale 2 puffs Daily. 3 inhalers, 3 month supply, Disp: 24 g, Rfl: 1    Drug Interactions  none     Adverse Drug Reactions  Adverse Reactions Experienced: none  Plan for ADR Management: N/A     Hospitalizations and Urgent Care Since Last Assessment  Hospitalizations or Admissions: none  ED Visits: none  Urgent Office Visits: none     Adherence and Self-Administration  Approximate Number of Doses Missed Since Last Assessment: none  Ongoing or New Barriers to Patient Adherence and/or Self-Administration: none   Methods for Supporting Patient Adherence and/or Self-Administration: N/A     Goals of Therapy  Goals related to the patient's specialty therapy were discussed with the patient. The Patient Goals segment of this outreach has been reviewed and updated.   Goals        Specialty Pharmacy General Goal      Cure patient of hepatitis c after 12 weeks of therapy and maintain adherence  above 90%              Progress Toward Meeting Patient-Identified Goals of Therapy: On Track  New Patient-Identified Goals, If Applicable:     Progress Toward Meeting Clinical Goals or Therapeutic Targets: On Track  New Clinical Goals or Therapeutic Targets, If Applicable:     Quality of Life Assessment   Quality of Life related to the patient's specialty therapy was discussed with the patient. The QOL segment of this outreach has been reviewed and updated.   Quality of Life Assessment  Quality of Life Improvement Scale: No change  Comments on Quality of Life: tolerating well    Reassessment Plan & Follow-Up  Medication Therapy Changes: none  Additional Plans, Therapy Recommendations, or Therapy Problems to Be Addressed: none   HCV was non detected on 5-9 per lab report.   Patient has about 7 days of 12 week therapy of Epclusa remaining. Will follow up in 10 days.  Pharmacist to perform regular reassessments no more than (6) months from the previous assessment.  Care Coordinator to set up future refill outreaches, coordinate prescription delivery, and escalate clinical questions to pharmacist.     Attestation  I attest that the specialty medication(s) addressed above are appropriate for the patient based on my reassessment. If the prescribed therapy is at any point deemed not appropriate based on the current or future assessments, a consultation will be initiated with the patient's specialty care provider to determine the best course of action. The revised plan of therapy will be documented along with any additional patient education provided.     Electronically signed by Jacobo Ward RPH, 06/06/23, 10:19 AM EDT.

## 2023-06-16 ENCOUNTER — SPECIALTY PHARMACY (OUTPATIENT)
Dept: PHARMACY | Facility: TELEHEALTH | Age: 53
End: 2023-06-16
Payer: COMMERCIAL

## 2023-06-19 ENCOUNTER — OFFICE VISIT (OUTPATIENT)
Dept: FAMILY MEDICINE CLINIC | Facility: CLINIC | Age: 53
End: 2023-06-19
Payer: COMMERCIAL

## 2023-06-19 VITALS
TEMPERATURE: 98 F | OXYGEN SATURATION: 98 % | DIASTOLIC BLOOD PRESSURE: 88 MMHG | HEIGHT: 70 IN | SYSTOLIC BLOOD PRESSURE: 142 MMHG | BODY MASS INDEX: 26.58 KG/M2 | RESPIRATION RATE: 15 BRPM | WEIGHT: 185.7 LBS | HEART RATE: 56 BPM

## 2023-06-19 DIAGNOSIS — J42 CHRONIC BRONCHITIS, UNSPECIFIED CHRONIC BRONCHITIS TYPE: ICD-10-CM

## 2023-06-19 DIAGNOSIS — Z87.891 PERSONAL HISTORY OF TOBACCO USE: ICD-10-CM

## 2023-06-19 DIAGNOSIS — M25.541 ARTHRALGIA OF RIGHT HAND: ICD-10-CM

## 2023-06-19 DIAGNOSIS — I10 PRIMARY HYPERTENSION: Primary | ICD-10-CM

## 2023-06-19 PROCEDURE — 3077F SYST BP >= 140 MM HG: CPT | Performed by: PHYSICIAN ASSISTANT

## 2023-06-19 PROCEDURE — 99214 OFFICE O/P EST MOD 30 MIN: CPT | Performed by: PHYSICIAN ASSISTANT

## 2023-06-19 PROCEDURE — 3079F DIAST BP 80-89 MM HG: CPT | Performed by: PHYSICIAN ASSISTANT

## 2023-06-19 RX ORDER — TIOTROPIUM BROMIDE AND OLODATEROL 3.124; 2.736 UG/1; UG/1
2 SPRAY, METERED RESPIRATORY (INHALATION)
Qty: 24 G | Refills: 1 | Status: SHIPPED | OUTPATIENT
Start: 2023-06-19

## 2023-06-19 RX ORDER — PROPRANOLOL HYDROCHLORIDE 80 MG/1
80 CAPSULE, EXTENDED RELEASE ORAL DAILY
Qty: 90 CAPSULE | Refills: 1 | Status: SHIPPED | OUTPATIENT
Start: 2023-06-19

## 2023-06-19 NOTE — PROGRESS NOTES
Patient Office Visit      Patient Name: Cecil Fermin  : 1970   MRN: [id number]     Chief Complaint:    Chief Complaint   Patient presents with    Hypertension    COPD    Joint Pain       History of Present Illness: Cecil Fermin is a 53 y.o. male who is here today for follow-up.  He has finished his hepatitis C treatment.  He said the infection has been cured.  Blood pressure has been running okay when he checks at home.  He thinks he may have had a colonoscopy a few years ago but is not sure.  He says he has had about 4 beers in the past week and is doing much better on the alcohol use.  He says he had 1 beer last night.  Continues to complain of pain and swelling middle knuckle of the right hand only.  We had talked about evaluating for possible RA.    Subjective      Review of Systems:   Review of Systems   Constitutional:  Negative for fatigue.   Respiratory:  Negative for shortness of breath.    Cardiovascular:  Negative for chest pain, palpitations and leg swelling.      Past Medical History:   Past Medical History:   Diagnosis Date    Alcoholism     COVID-19     X2-has had shortness of breath since last infection       Past Surgical History: History reviewed. No pertinent surgical history.    Family History:   Family History   Problem Relation Age of Onset    Ovarian cancer Mother          age 59    Lung cancer Father          age 47    Lung cancer Sister     Ovarian cancer Sister     Pneumonia Brother          age 39 from COVID-pneumonia    Pneumonia Brother          age 56 from COVID-pneumonia    Pneumonia Maternal Grandmother          age 57 from COVID-pneumonia       Social History:   Social History     Socioeconomic History    Marital status:    Tobacco Use    Smoking status: Every Day     Packs/day: 2.00     Years: 41.00     Pack years: 82.00     Types: Cigarettes     Start date:     Smokeless tobacco: Never   Vaping Use    Vaping Use: Never used  "  Substance and Sexual Activity    Alcohol use: Not Currently     Comment: patient is a recovering alc- every now then    Drug use: Yes     Types: Marijuana    Sexual activity: Not Currently       Allergies:   No Known Allergies    Objective     Physical Exam:  Vital Signs:   Vitals:    06/19/23 0804   BP: 142/88   BP Location: Right arm   Patient Position: Sitting   Cuff Size: Adult   Pulse: 56   Resp: 15   Temp: 98 °F (36.7 °C)   TempSrc: Temporal   SpO2: 98%   Weight: 84.2 kg (185 lb 11.2 oz)   Height: 177.8 cm (70\")     Body mass index is 26.65 kg/m².   BMI is >= 25 and <30. (Overweight) The following options were offered after discussion;: weight loss educational material (shared in after visit summary)       Physical Exam  Constitutional:       General: He is not in acute distress.     Appearance: Normal appearance.   Musculoskeletal:      Comments: Mild swelling right MCP joint.   Neurological:      Mental Status: He is alert.   Psychiatric:         Mood and Affect: Mood normal.         Behavior: Behavior normal.         Thought Content: Thought content normal.         Judgment: Judgment normal.       Procedures    Assessment / Plan      Assessment/Plan:   Diagnoses and all orders for this visit:    1. Primary hypertension (Primary)  Assessment & Plan:  Hypertension is improving with treatment.  Continue current treatment regimen.  Blood pressure will be reassessed at the next regular appointment.  Blood pressure is just a little elevated today.  He says running okay at home.  I told him to continue to monitor and let me know if consistently running over 140/90 so we can make adjustments otherwise reevaluate in 6 months.    Orders:  -     propranolol LA (Inderal LA) 80 MG 24 hr capsule; Take 1 capsule by mouth Daily.  Dispense: 90 capsule; Refill: 1    2. Chronic bronchitis, unspecified chronic bronchitis type  Assessment & Plan:  Continue Stiolto.    Orders:  -     tiotropium bromide-olodaterol (Stiolto " Respimat) 2.5-2.5 MCG/ACT aerosol solution inhaler; Inhale 2 puffs Daily. 3 inhalers, 3 month supply  Dispense: 24 g; Refill: 1    3. Personal history of tobacco use  Assessment & Plan:  Lung cancer screening, >= 20 pk-yr smoking history (Age >= 50y) The patient is age 50-80 (Medicare coverage 50-77): 55 The patient is a current smoker? Yes The patient has a smoking history of 20 pack-years or greater: Yes Actual pack - year smoking history (number): 41 Does the patient have any clinical signs/symptoms of lung cancer? No The patient was engaged in shared decision-making for this test: Yes     Orders:  -      CT Chest Low Dose Cancer Screening WO; Future    4. Arthralgia of right hand  Assessment & Plan:  This is most likely osteoarthritis isolated to the 1 joint due to wear and tear.  I offered hand surgery referral.  He said its not that bad yet.  I did let him know there are things they could probably do to help so whenever he decides he is ready for referral he can let me know.           I did review vaccinations.  He declines all vaccinations at this time.  Also he will check with his wife on whether or not he has had a colonoscopy.  We did discuss colonoscopy versus Cologuard.  He will check with his wife to see if he had a colonoscopy.  He knows he had some type of procedure he thinks at [facility].  If he finds out this was a colonoscopy he will let us know where so we can track down results otherwise discuss next visit.    Medications:     Current Outpatient Medications:     ondansetron ODT (ZOFRAN-ODT) 8 MG disintegrating tablet, Place 1 tablet on the tongue Every 8 (Eight) Hours As Needed for Nausea or Vomiting., Disp: 60 tablet, Rfl: 1    propranolol LA (Inderal LA) 80 MG 24 hr capsule, Take 1 capsule by mouth Daily., Disp: 90 capsule, Rfl: 1    tiotropium bromide-olodaterol (Stiolto Respimat) 2.5-2.5 MCG/ACT aerosol solution inhaler, Inhale 2 puffs Daily. 3 inhalers, 3 month supply, Disp: 24 g, Rfl:  1        Follow Up:   Return in about 6 months (around 12/19/2023) for Annual physical.    Marlene Mckenzie PA-C   [facility] [place] [facility]

## 2023-06-19 NOTE — ASSESSMENT & PLAN NOTE
Lung cancer screening, >= 20 pk-yr smoking history (Age >= 50y) The patient is age 50-80 (Medicare coverage 50-77): 55 The patient is a current smoker? Yes The patient has a smoking history of 20 pack-years or greater: Yes Actual pack - year smoking history (number): 41 Does the patient have any clinical signs/symptoms of lung cancer? No The patient was engaged in shared decision-making for this test: Yes

## 2023-06-19 NOTE — ASSESSMENT & PLAN NOTE
Hypertension is improving with treatment.  Continue current treatment regimen.  Blood pressure will be reassessed at the next regular appointment.  Blood pressure is just a little elevated today.  He says running okay at home.  I told him to continue to monitor and let me know if consistently running over 140/90 so we can make adjustments otherwise reevaluate in 6 months.

## 2023-06-19 NOTE — ASSESSMENT & PLAN NOTE
This is most likely osteoarthritis isolated to the 1 joint due to wear and tear.  I offered hand surgery referral.  He said its not that bad yet.  I did let him know there are things they could probably do to help so whenever he decides he is ready for referral he can let me know.

## 2023-06-19 NOTE — PATIENT INSTRUCTIONS
Lung Cancer Screening  A lung cancer screening is a test that checks for lung cancer. Lung cancer screening is done to look for lung cancer in its very early stages when you are not likely to have any symptoms and before it spreads beyond the lung, making it harder to treat. Finding cancer early improves the chances of successful treatment. It may save your life.  Who should have screening?  You should be screened for lung cancer if all of these apply:  You currently smoke or you have quit smoking within the past 15 years.  You are 50-80 years old. Screening may be recommended up to age 80 depending on your overall health and other factors.  You are in good general health.  You have a smoking history of 1 pack of cigarettes a day for 20 years or 2 packs a day for 10 years.  Screening may also be recommended if you are at high risk for the disease. You may be at high risk if:  You have a family history of lung cancer.  You have been exposed to asbestos or radon.  You have chronic obstructive pulmonary disease (COPD).  How is screening done?    The recommended screening test is a low-dose computed tomography (LDCT) scan. This scan takes detailed images of the lungs. This allows a health care provider to look for abnormal cells. If you are at risk for lung cancer, it is recommended that you get screened once a year. Talk to your health care provider about the risks, benefits, and limitations of screening.  What are the benefits of screening?  Screening can find lung cancer early, before symptoms start and before it has spread outside of the lungs. The chances of curing lung cancer are greater if the cancer is diagnosed early.  What are the risks of screening?  The screening may show lung cancer when no cancer is present (false-positive result).  The screening may not find lung cancer when it is present.  The person gets exposed to radiation.  How can I lower my risk of lung cancer?  Make these lifestyle changes to  lower your risk of developing lung cancer:  Do not use any products that contain nicotine or tobacco, such as cigarettes, e-cigarettes, and chewing tobacco. If you need help quitting, ask your health care provider.  Avoid secondhand smoke.  Avoid exposure to radiation.  Avoid exposure to radon gas. Have your home checked for radon regularly.  Avoid things that cause cancer (carcinogens).  Avoid living or working in places with high air pollution.  Questions to ask your health care provider  Am I eligible for lung cancer screening?  Does my health insurance cover the cost of lung cancer screening?  What happens if the lung cancer screening shows something of concern?  How soon will I have results from my lung cancer screening?  Is there anything that I need to do to prepare for my lung cancer screening?  What happens if I decide not to have lung cancer screening?  Where to find more information  Ask your health care provider about the risks and benefits of screening. More information and resources are available from these organizations:  American Cancer Society (ACS): www.cancer.org  American Lung Association: www.lung.org  Contact a health care provider if:  You start to show symptoms of lung cancer, including:  Coughing that will not go away.  Making whistling sounds when you breathe (wheezing).  Chest pain.  Coughing up blood.  Shortness of breath.  Weight loss that cannot be explained.  Constant tiredness (fatigue).  Hoarse voice.  Summary  Lung cancer screening may find lung cancer before symptoms appear. Finding cancer early improves the chances of successful treatment. It may save your life.  The recommended screening test is a low-dose computed tomography (LDCT) scan that looks for abnormal cells in the lungs. If you are at risk for lung cancer, it is recommended that you get screened once a year.  You can make lifestyle changes to lower your risk of lung cancer.  Ask your health care provider about the risks  and benefits of screening.  This information is not intended to replace advice given to you by your health care provider. Make sure you discuss any questions you have with your health care provider.  Document Revised: 05/10/2021 Document Reviewed: 12/15/2020  Elsevier Patient Education © 2021 [employer]    Healthy Eating  Following a healthy eating pattern may help you to achieve and maintain a healthy body weight, reduce the risk of chronic disease, and live a long and productive life. It is important to follow a healthy eating pattern at an appropriate calorie level for your body. Your nutritional needs should be met primarily through food by choosing a variety of nutrient-rich foods.  What are tips for following this plan?  Reading food labels  Read labels and choose the following:  Reduced or low sodium.  Juices with 100% fruit juice.  Foods with low saturated fats and high polyunsaturated and monounsaturated fats.  Foods with whole grains, such as whole wheat, cracked wheat, brown rice, and wild rice.  Whole grains that are fortified with folic acid. This is recommended for women who are pregnant or who want to become pregnant.  Read labels and avoid the following:  Foods with a lot of added sugars. These include foods that contain brown sugar, corn sweetener, corn syrup, dextrose, fructose, glucose, high-fructose corn syrup, honey, invert sugar, lactose, malt syrup, maltose, molasses, raw sugar, sucrose, trehalose, or turbinado sugar.  Do not eat more than the following amounts of added sugar per day:  6 teaspoons (25 g) for women.  9 teaspoons (38 g) for men.  Foods that contain processed or refined starches and grains.  Refined grain products, such as white flour, degermed cornmeal, white bread, and white rice.  Shopping  Choose nutrient-rich snacks, such as vegetables, whole fruits, and nuts. Avoid high-calorie and high-sugar snacks, such as potato chips, fruit snacks, and candy.  Use oil-based dressings  "and spreads on foods instead of solid fats such as butter, stick margarine, or cream cheese.  Limit pre-made sauces, mixes, and \"instant\" products such as flavored rice, instant noodles, and ready-made pasta.  Try more plant-protein sources, such as tofu, tempeh, black beans, edamame, lentils, nuts, and seeds.  Explore eating plans such as the Mediterranean diet or vegetarian diet.  Cooking  Use oil to sauté or stir-yu foods instead of solid fats such as butter, stick margarine, or lard.  Try baking, boiling, grilling, or broiling instead of frying.  Remove the fatty part of meats before cooking.  Steam vegetables in water or broth.  Meal planning    At meals, imagine dividing your plate into fourths:  One-half of your plate is fruits and vegetables.  One-fourth of your plate is whole grains.  One-fourth of your plate is protein, especially lean meats, poultry, eggs, tofu, beans, or nuts.  Include low-fat dairy as part of your daily diet.     Lifestyle  Choose healthy options in all settings, including home, work, school, restaurants, or stores.  Prepare your food safely:  Wash your hands after handling raw meats.  Keep food preparation surfaces clean by regularly washing with hot, soapy water.  Keep raw meats separate from ready-to-eat foods, such as fruits and vegetables.  , meat, poultry, and eggs to the recommended internal temperature.  Store foods at safe temperatures. In general:  Keep cold foods at 40°F (4.4°C) or below.  Keep hot foods at 140°F (60°C) or above.  Keep your freezer at 0°F (-17.8°C) or below.  Foods are no longer safe to eat when they have been between the temperatures of 40°-140°F (4.4-60°C) for more than 2 hours.  What foods should I eat?  Fruits  Aim to eat 2 cup-equivalents of fresh, canned (in natural juice), or frozen fruits each day. Examples of 1 cup-equivalent of fruit include 1 small apple, 8 large strawberries, 1 cup canned fruit, ½ cup dried fruit, or 1 cup 100% " juice.  Vegetables  Aim to eat 2½-3 cup-equivalents of fresh and frozen vegetables each day, including different varieties and colors. Examples of 1 cup-equivalent of vegetables include 2 medium carrots, 2 cups raw, leafy greens, 1 cup chopped vegetable (raw or cooked), or 1 medium baked potato.  Grains  Aim to eat 6 ounce-equivalents of whole grains each day. Examples of 1 ounce-equivalent of grains include 1 slice of bread, 1 cup ready-to-eat cereal, 3 cups popcorn, or ½ cup cooked rice, pasta, or cereal.  Meats and other proteins  Aim to eat 5-6 ounce-equivalents of protein each day. Examples of 1 ounce-equivalent of protein include 1 egg, 1/2 cup nuts or seeds, or 1 tablespoon (16 g) peanut butter. A cut of meat or fish that is the size of a deck of cards is about 3-4 ounce-equivalents.  Of the protein you eat each week, try to have at least 8 ounces come from seafood. This includes salmon, trout, herring, and anchovies.  Dairy  Aim to eat 3 cup-equivalents of fat-free or low-fat dairy each day. Examples of 1 cup-equivalent of dairy include 1 cup (240 mL) milk, 8 ounces (250 g) yogurt, 1½ ounces (44 g) natural cheese, or 1 cup (240 mL) fortified soy milk.  Fats and oils  Aim for about 5 teaspoons (21 g) per day. Choose monounsaturated fats, such as canola and olive oils, avocados, peanut butter, and most nuts, or polyunsaturated fats, such as sunflower, corn, and soybean oils, walnuts, pine nuts, sesame seeds, sunflower seeds, and flaxseed.  Beverages  Aim for six 8-oz glasses of water per day. Limit coffee to three to five 8-oz cups per day.  Limit caffeinated beverages that have added calories, such as soda and energy drinks.  Limit alcohol intake to no more than 1 drink a day for nonpregnant women and 2 drinks a day for men. One drink equals 12 oz of beer (355 mL), 5 oz of wine (148 mL), or 1½ oz of hard liquor (44 mL).  Seasoning and other foods  Avoid adding excess amounts of salt to your foods. Try  flavoring foods with herbs and spices instead of salt.  Avoid adding sugar to foods.  Try using oil-based dressings, sauces, and spreads instead of solid fats.  This information is based on general [place] nutrition guidelines. For more information, visit choosemyplate.gov. Exact amounts may vary based on your nutrition needs.  Summary  A healthy eating plan may help you to maintain a healthy weight, reduce the risk of chronic diseases, and stay active throughout your life.  Plan your meals. Make sure you eat the right portions of a variety of nutrient-rich foods.  Try baking, boiling, grilling, or broiling instead of frying.  Choose healthy options in all settings, including home, work, school, restaurants, or stores.  This information is not intended to replace advice given to you by your health care provider. Make sure you discuss any questions you have with your health care provider.  Document Revised: 04/01/2019 Document Reviewed: 04/01/2019  [employer] Patient Education © 2021 [employer] Inc[employer]

## 2023-10-25 ENCOUNTER — OFFICE VISIT (OUTPATIENT)
Dept: FAMILY MEDICINE CLINIC | Facility: CLINIC | Age: 53
End: 2023-10-25
Payer: COMMERCIAL

## 2023-10-25 ENCOUNTER — OFFICE VISIT (OUTPATIENT)
Dept: CARDIOLOGY | Facility: CLINIC | Age: 53
End: 2023-10-25
Payer: COMMERCIAL

## 2023-10-25 ENCOUNTER — TELEPHONE (OUTPATIENT)
Dept: CARDIOLOGY | Facility: CLINIC | Age: 53
End: 2023-10-25

## 2023-10-25 VITALS
RESPIRATION RATE: 18 BRPM | BODY MASS INDEX: 27.35 KG/M2 | DIASTOLIC BLOOD PRESSURE: 100 MMHG | OXYGEN SATURATION: 96 % | WEIGHT: 191 LBS | SYSTOLIC BLOOD PRESSURE: 184 MMHG | HEIGHT: 70 IN | HEART RATE: 54 BPM

## 2023-10-25 VITALS
SYSTOLIC BLOOD PRESSURE: 160 MMHG | HEIGHT: 70 IN | RESPIRATION RATE: 15 BRPM | BODY MASS INDEX: 27.43 KG/M2 | DIASTOLIC BLOOD PRESSURE: 102 MMHG | HEART RATE: 57 BPM | OXYGEN SATURATION: 98 % | WEIGHT: 191.6 LBS | TEMPERATURE: 98 F

## 2023-10-25 DIAGNOSIS — R07.2 PRECORDIAL PAIN: Primary | ICD-10-CM

## 2023-10-25 DIAGNOSIS — D23.4 DERMOID CYST OF SCALP: ICD-10-CM

## 2023-10-25 DIAGNOSIS — I73.9 PVD (PERIPHERAL VASCULAR DISEASE) WITH CLAUDICATION: ICD-10-CM

## 2023-10-25 DIAGNOSIS — E78.00 PURE HYPERCHOLESTEROLEMIA: ICD-10-CM

## 2023-10-25 DIAGNOSIS — T50.905A BRADYCARDIA, DRUG INDUCED: ICD-10-CM

## 2023-10-25 DIAGNOSIS — I10 PRIMARY HYPERTENSION: ICD-10-CM

## 2023-10-25 DIAGNOSIS — R73.9 HYPERGLYCEMIA: ICD-10-CM

## 2023-10-25 DIAGNOSIS — Z91.89 CHRONIC CHEST PAIN WITH HIGH RISK FOR CAD: Primary | ICD-10-CM

## 2023-10-25 DIAGNOSIS — G45.9 TIA (TRANSIENT ISCHEMIC ATTACK): ICD-10-CM

## 2023-10-25 DIAGNOSIS — Z79.899 HIGH RISK MEDICATION USE: ICD-10-CM

## 2023-10-25 DIAGNOSIS — R94.31 ABNORMAL EKG: ICD-10-CM

## 2023-10-25 DIAGNOSIS — G89.29 CHRONIC CHEST PAIN WITH HIGH RISK FOR CAD: Primary | ICD-10-CM

## 2023-10-25 DIAGNOSIS — R00.1 BRADYCARDIA, DRUG INDUCED: ICD-10-CM

## 2023-10-25 DIAGNOSIS — R53.83 OTHER FATIGUE: ICD-10-CM

## 2023-10-25 DIAGNOSIS — R07.9 CHRONIC CHEST PAIN WITH HIGH RISK FOR CAD: Primary | ICD-10-CM

## 2023-10-25 DIAGNOSIS — F17.200 SMOKING: ICD-10-CM

## 2023-10-25 PROCEDURE — 3080F DIAST BP >= 90 MM HG: CPT | Performed by: PHYSICIAN ASSISTANT

## 2023-10-25 PROCEDURE — 93000 ELECTROCARDIOGRAM COMPLETE: CPT | Performed by: PHYSICIAN ASSISTANT

## 2023-10-25 PROCEDURE — 99214 OFFICE O/P EST MOD 30 MIN: CPT | Performed by: PHYSICIAN ASSISTANT

## 2023-10-25 PROCEDURE — 3077F SYST BP >= 140 MM HG: CPT | Performed by: PHYSICIAN ASSISTANT

## 2023-10-25 RX ORDER — AZILSARTAN KAMEDOXOMIL AND CHLORTHALIDONE 40; 12.5 MG/1; MG/1
1 TABLET ORAL DAILY
Qty: 30 TABLET | Refills: 1 | Status: SHIPPED | OUTPATIENT
Start: 2023-10-25 | End: 2023-10-27

## 2023-10-25 RX ORDER — PROPRANOLOL HCL 60 MG
60 CAPSULE, EXTENDED RELEASE 24HR ORAL DAILY
Qty: 30 CAPSULE | Refills: 2 | Status: SHIPPED | OUTPATIENT
Start: 2023-10-25

## 2023-10-25 RX ORDER — ROSUVASTATIN CALCIUM 5 MG/1
5 TABLET, COATED ORAL DAILY
Qty: 30 TABLET | Refills: 11 | Status: SHIPPED | OUTPATIENT
Start: 2023-10-25

## 2023-10-25 NOTE — ASSESSMENT & PLAN NOTE
There are some nonspecific ST-T wave changes on today's EKG that were not previous in January of this year.  He is feeling a little better today but his blood pressure is significantly elevated compared to previous.  I discussed emergency department visit versus seeing cardiology.  Since he appears stable and actually he says he is starting to feel somewhat better and cardiology can get him in this morning I am going to go ahead and refer to cardiology.

## 2023-10-25 NOTE — PROGRESS NOTES
Patient Office Visit      Patient Name: Cecil Fermin  : 1970   MRN: [id number]     Chief Complaint:    Chief Complaint   Patient presents with    Chest Pain    Fatigue    Cyst    Hypertension       History of Present Illness: Cecil Fermin is a 53 y.o. male who is here today as scheduled for a routine follow-up visit but he complains of fatigue that started several months ago.  He said he constantly feels bloated.  He has been treated for hepatitis C and that has resolved.  He started having some chest pressure with exertional shortness of breath about 2 weeks ago while he was working on a car.  He felt lightheaded and dizzy when he stood up.  He is feeling a little better today.  He says he has been told in the past that he has had a couple of mini strokes.  He is compliant with taking his blood pressure medication.    Subjective      Review of Systems:   Review of Systems   Constitutional:  Positive for fatigue.   Respiratory:  Positive for shortness of breath.    Cardiovascular:  Positive for chest pain.   Gastrointestinal:  Positive for abdominal distention.        Past Medical History:   Past Medical History:   Diagnosis Date    Alcoholism     COVID-19     X2-has had shortness of breath since last infection       Past Surgical History: History reviewed. No pertinent surgical history.    Family History:   Family History   Problem Relation Age of Onset    Ovarian cancer Mother          age 59    Lung cancer Father          age 47    Lung cancer Sister     Ovarian cancer Sister     Pneumonia Brother          age 39 from COVID-pneumonia    Pneumonia Brother          age 56 from COVID-pneumonia    Pneumonia Maternal Grandmother          age 57 from COVID-pneumonia       Social History:   Social History     Socioeconomic History    Marital status:    Tobacco Use    Smoking status: Every Day     Packs/day: 2.00     Years: 41.00     Additional pack years: 0.00      "Total pack years: 82.00     Types: Cigarettes     Start date: 1982    Smokeless tobacco: Never   Vaping Use    Vaping Use: Never used   Substance and Sexual Activity    Alcohol use: Not Currently     Comment: patient is a recovering alc- every now then    Drug use: Yes     Types: Marijuana    Sexual activity: Not Currently       Allergies:   No Known Allergies    Objective     Physical Exam:  Vital Signs:   Vitals:    10/25/23 0803   BP: (!) 160/102   BP Location: Left arm   Patient Position: Sitting   Cuff Size: Adult   Pulse: 57   Resp: 15   Temp: 98 °F (36.7 °C)   TempSrc: Temporal   SpO2: 98%   Weight: 86.9 kg (191 lb 9.6 oz)   Height: 177.8 cm (70\")     Body mass index is 27.49 kg/m².           Physical Exam  Cardiovascular:      Rate and Rhythm: Bradycardia present.   Pulmonary:      Effort: Pulmonary effort is normal.      Breath sounds: Normal breath sounds.   Skin:     Comments: Top of the scalp with a 3 cm sebaceous cyst.   Neurological:      Mental Status: He is alert and oriented to person, place, and time.   Psychiatric:         Mood and Affect: Mood normal.         Behavior: Behavior normal.         Thought Content: Thought content normal.         Judgment: Judgment normal.           ECG 12 Lead    Date/Time: 10/25/2023 8:32 AM  Performed by: Marlene Mckenzie PA    Authorized by: Marlene Mckenzie PA  Comparison: compared with previous ECG from 1/20/2023  Comparison to previous ECG: ST T wave changes on current EKG that were not present on previous  Rhythm: sinus bradycardia  BPM: 52  QRS axis: normal    Clinical impression: abnormal EKG  Comments: Non-specific ST T wave abnormality           Assessment / Plan      Assessment/Plan:   Diagnoses and all orders for this visit:    1. Precordial pain (Primary)  Assessment & Plan:  There are some nonspecific ST-T wave changes on today's EKG that were not previous in January of this year.  He is feeling a little better today but his blood pressure is " significantly elevated compared to previous.  I discussed emergency department visit versus seeing cardiology.  Since he appears stable and actually he says he is starting to feel somewhat better and cardiology can get him in this morning I am going to go ahead and refer to cardiology.    Orders:  -     Ambulatory Referral to Cardiology  -     ECG 12 Lead    2. Abnormal EKG  -     Ambulatory Referral to Cardiology  -     ECG 12 Lead    3. Pure hypercholesterolemia  -     Lipid Panel    4. Hyperglycemia  -     Hemoglobin A1c    5. Other fatigue  Assessment & Plan:  We will go ahead and collect his labs today and have him follow-up in a couple weeks.    Orders:  -     Comprehensive Metabolic Panel  -     Vitamin B12  -     Folate  -     TSH  -     T4, Free  -     CBC Auto Differential    6. High risk medication use  -     CK    7. Dermoid cyst of scalp  -     Ambulatory Referral to General Surgery         Medications:     Current Outpatient Medications:     ondansetron ODT (ZOFRAN-ODT) 8 MG disintegrating tablet, Place 1 tablet on the tongue Every 8 (Eight) Hours As Needed for Nausea or Vomiting., Disp: 60 tablet, Rfl: 1    propranolol LA (Inderal LA) 80 MG 24 hr capsule, Take 1 capsule by mouth Daily., Disp: 90 capsule, Rfl: 1    tiotropium bromide-olodaterol (Stiolto Respimat) 2.5-2.5 MCG/ACT aerosol solution inhaler, Inhale 2 puffs Daily. 3 inhalers, 3 month supply, Disp: 24 g, Rfl: 1        Follow Up:   No follow-ups on file.    Marlene Mckenzie PA-C   [facility] [place] [facility]

## 2023-10-25 NOTE — PROGRESS NOTES
[facility] [place]  [facility] CARDIOLOGY  [address] [place] [address]  Dept: [phone]  Dept Fax: [phone]    Date: 10/25/2023  Patient: Cecil Fermin  YOB: 1970    New Patient Office Note    Consult Reason:  Mr. Cecil Fermin is a 53 y.o. male who presents to the clinic to establish care, seen for unspecified Heart Problem.   Patient was seen at his primary care office today, had an EKG done that was felt worrisome, along with poor blood pressure control.  Patient referred to the cardiology clinic.  Patient admits having a fall 2 weeks ago, when he was on a ladder.  All of a sudden he felt lightheaded, with white spots in his visual fields, along with left arm weakness/tingling/no pain as well as speech difficulties.  Patient had some left-sided chest pain around that time, that resolved after a few minutes.  History of longstanding hypertension, with blood pressure being poorly controlled recently.  Patient does not have a blood pressure monitor at home so unable to quantify magnitude.  Patient admits leg cramping, left worse than the right, with exertion.  Patient admits shortness of breath with exertion, limiting his daily activities to mild-moderate.  He denies palpitations.  Patient heavy smoker, poor diet and high salt intake.  The following portions of the patient's history were reviewed and updated as appropriate: allergies, current medications, past family history, past medical history, past social history, past surgical history, and problem list.    Medications: No Known Allergies   Current Outpatient Medications   Medication Instructions    Azilsartan-Chlorthalidone (Edarbyclor) 40-12.5 MG tablet 1 tablet, Oral, Daily    ondansetron ODT (ZOFRAN-ODT) 8 mg, Translingual, Every 8 Hours PRN    propranolol LA (INDERAL LA) 60 mg, Oral, Daily    rosuvastatin (CRESTOR) 5 mg, Oral, Daily    tiotropium bromide-olodaterol (Stiolto Respimat) 2.5-2.5  "MCG/ACT aerosol solution inhaler 2 puffs, Inhalation, Daily - RT, 3 inhalers, 3 month supply       Subjective  Past Medical History:   Diagnosis Date    Alcoholism     COVID-19     X2-has had shortness of breath since last infection       History reviewed. No pertinent surgical history.    Family History   Problem Relation Age of Onset    Ovarian cancer Mother          age 59    Lung cancer Father          age 47    Lung cancer Sister     Ovarian cancer Sister     Pneumonia Brother          age 39 from COVID-pneumonia    Pneumonia Brother          age 56 from COVID-pneumonia    Pneumonia Maternal Grandmother          age 57 from COVID-pneumonia        Social History     Socioeconomic History    Marital status:    Tobacco Use    Smoking status: Every Day     Packs/day: 3.00     Years: 20.00     Additional pack years: 0.00     Total pack years: 60.00     Types: Cigarettes     Start date:     Smokeless tobacco: Never   Vaping Use    Vaping Use: Never used   Substance and Sexual Activity    Alcohol use: Not Currently     Comment: patient is a recovering alc- every now then    Drug use: Yes     Types: Marijuana    Sexual activity: Not Currently       Objective  Vitals:    10/25/23 0913   BP: (!) 184/100   BP Location: Right arm   Patient Position: Sitting   Cuff Size: Adult   Pulse: 54   Resp: 18   SpO2: 96%   Weight: 86.6 kg (191 lb)   Height: 177.8 cm (70\")   PainSc: 0-No pain       Physical Exam  Constitutional:       Appearance: Healthy appearance. Not in distress.   Eyes:      Pupils: Pupils are equal, round, and reactive to light.   HENT:    Mouth/Throat:      Mouth: Mucous membranes are moist.   Neck:      Vascular: No carotid bruit, hepatojugular reflux, JVD or JVR. JVD normal.   Pulmonary:      Effort: Pulmonary effort is normal.      Breath sounds: Normal breath sounds. No wheezing. No rhonchi. No rales.   Chest:      Chest wall: Not tender to palpatation.   Cardiovascular:      " "PMI at left midclavicular line. Normal rate. Regular rhythm. Normal S1 with normal intensity. Normal S2 with normal intensity.       Murmurs: There is no murmur.      No gallop.  No click. No rub.   Pulses:     Carotid: 4+ bilaterally.     Radial: 4+ bilaterally.     Dorsalis pedis: 1+ on the left side and 4+ on the right side.     Posterior tibial: 1+ on the left side and 4+ on the right side.  Edema:     Peripheral edema absent.   Abdominal:      General: There is no abdominal bruit.   Skin:     General: Skin is warm.   Neurological:      Mental Status: Alert and oriented to person, place and time.          Labs:  Lab Results   Component Value Date     05/09/2023    K 3.9 05/09/2023     05/09/2023    CO2 19 (L) 05/09/2023    BUN 15 05/09/2023    CREATININE 1.15 05/09/2023    CALCIUM 9.8 05/09/2023    BILITOT 0.9 05/09/2023    ALKPHOS 62 05/09/2023    ALT 19 05/09/2023    AST 26 05/09/2023    GLUCOSE 118 (H) 05/09/2023    ALBUMIN 4.5 05/09/2023     Lab Results   Component Value Date    WBC 7.2 01/20/2023    HGB 15.5 01/20/2023    HCT 45.2 01/20/2023     01/20/2023     No results found for: \"APTT\", \"INR\", \"PTT\"  Lab Results   Component Value Date    CKTOTAL 100 01/20/2023     No results found for: \"BNP\"    Lab Results   Component Value Date    TRIG 138 01/20/2023    HDL 32 (L) 01/20/2023     (H) 01/20/2023     Lab Results   Component Value Date    TSH 0.965 01/20/2023    FREET4 1.25 01/20/2023       The 10-year ASCVD risk score (Krishan CERVANTES, et al., 2019) is: 29.7%    Values used to calculate the score:      Age: 53 years      Sex: Male      Is Non- : No      Diabetic: No      Tobacco smoker: Yes      Systolic Blood Pressure: 184 mmHg      Is BP treated: Yes      HDL Cholesterol: 32 mg/dL      Total Cholesterol: 192 mg/dL     [facility]:    ECG 12 Lead    Date/Time: 10/25/2023 9:45 AM  Performed by: Jamel Holder MD    Authorized by: Jamel Holder MD  Comparison: " compared with previous ECG   Comparison to previous ECG: Similar to previous EKG except sinus rhythm, as opposed to sinus bradycardia.  Rhythm: sinus rhythm  Other findings: non-specific ST-T wave changes  Comments: Sinus bradycardia with nonspecific ST-T changes.          ECHO/MUGA: No results found for this or any previous visit.     STRESS TESTS: No results found for this or any previous visit.     CARDIAC CATH: No results found for this or any previous visit.     DEVICES: No valid procedures specified.   HOLTER: No results found for this or any previous visit.     CT/MRI:  No results found for this or any previous visit.    VASCULAR: No valid procedures specified.     Assessment and Plan  Chronic chest pain with high risk for CAD  Patient with significant risk factors (age, male gender, hypertension, dyslipidemia, heavy smoking, poor diet), had an episode of chest pain followed by fall from a ladder.  Patient with dyspnea on exertion, along with claudication, suggestive of angina equivalent.  Patient's blood pressure is very poorly controlled.  EKG with no acute findings.  No recent chest pain.  Blood pressure poorly controlled, putting the patient at risk for stroke if stress test done.  We will defer stress testing for now, while awaiting better blood pressure control.  We will order an echocardiogram to assess cardiac function and looking for findings suggestive of hypertensive cardiomyopathy.    Bradycardia, drug induced  Patient on Inderal for blood pressure control.  Decrease dose to 60 mg 1 tab daily in view of bradycardia.  Patient will need polytherapy for his blood pressure control in view of his extremely high reading today    TIA (transient ischemic attack)  Suspected 2 weeks ago.  Residual symptoms as per patient type speech abnormalities and visual problems.  We will do an expert referral to optometry, as well as brain MRI with contrast as work-up for stroke/TIA.  Unable to start aspirin in view of  high blood pressure.  Start low-dose rosuvastatin 5 mg daily to see if well-tolerated.  Start Edarbiclor 40-12.5 for blood pressure control, first dose in the clinic, in view of manual check of blood pressure 210/110.    Pure hypercholesterolemia  Patient with claudications and angina.  Needs to start statin for secondary prevention.  Start rosuvastatin 5 mg 1 tab daily.    Primary hypertension  Blood pressure uncontrolled.  High salt diet identified.  Patient does not have a blood pressure monitor at home so strongly encouraged to get a blood pressure monitor as soon as he leaves the clinic.  Will bring back patient within 2 days for an echocardiogram and a blood pressure check.  He was encouraged low-salt diet.  Start patient on Edarbiclor 40-12.5 first dose in office, with blood pressure in severe range before patient left.  Counseled patient against leaving the clinic with such a high blood pressure, but patient refused/refusal documented.  Patient to go to the hospital for any signs or symptoms including but not limited to chest pain, headache, dizziness, palpitations, shortness of breath, syncope, blurry vision, weakness/numbness/speech problems.     PVD (peripheral vascular disease) with claudication  We will be getting an NICOLAS to assess severity of peripheral vessel disease.  Patient was started on a statin.  Patient to be started on aspirin as soon as blood pressure better controlled.  Encourage exercise, heart healthy diet, weight loss.     7.   Smoking        He reports that he has been smoking cigarettes. He started smoking about 41 years ago. He has a 60.00 pack-year smoking history. He has never used smokeless tobacco.. I have educated him on the risk of diseases from using tobacco products such as cancer, COPD, heart disease, and stroke . I advised him to quit and he is willing to quit. We have discussed the following method/s for tobacco cessation:  Education Material Counseling.  He will follow up  with me in 1 week or sooner to check on his progress.    I spent >10 minutes counseling the patient.    Orders Placed This Encounter   Procedures    MRI Brain With Contrast    Ambulatory Referral to Optometry    ECG 12 Lead    Adult Transthoracic Echo Complete W/ Cont if Necessary Per Protocol      Return in about 1 week (around 11/1/2023).    Jamel Holder MD

## 2023-10-25 NOTE — PATIENT INSTRUCTIONS
[facility] [place]  [facility] CARDIOLOGY  [address] [place] [address]  Dept: [phone]  Dept Fax: [phone]    Date:   Patient: Cecil Fermin    Your Blood Pressure Toolkit  Provided By: Jamel Holder MD    Blood Pressure Log  Date Blood Pressure Heart Rate Comments   Thursday October 26, 2023       Friday October 27, 2023       Saturday October 28, 2023       Nakul October 29, 2023       Monday October 30, 2023       Tuesday October 31, 2023       Wednesday November 1, 2023       Thursday November 2, 2023       Friday November 3, 2023       Saturday November 4, 2023       Nakul November 5, 2023       Monday November 6, 2023       Tuesday November 7, 2023       Wednesday November 8, 2023       Thursday November 9, 2023       Friday November 10, 2023       Saturday November 11, 2023       Nakul November 12, 2023       Monday November 13, 2023       Tuesday November 14, 2023       Wednesday November 15, 2023       Thursday November 16, 2023       Friday November 17, 2023       Saturday November 18, 2023       Nakul November 19, 2023       Monday November 20, 2023       Tuesday November 21, 2023       Wednesday November 22, 2023       Thursday November 23, 2023           Low-Sodium Eating Plan  Sodium, which is an element that makes up salt, helps you maintain a healthy balance of fluids in your body. Too much sodium can increase your blood pressure and cause fluid and waste to be held in your body.  Your health care provider or dietitian may recommend following this plan if you have high blood pressure (hypertension), kidney disease, liver disease, or heart failure. Eating less sodium can help lower your blood pressure, reduce swelling, and protect your heart, liver, and kidneys.  What are tips for following this plan?  Reading food labels  The Nutrition Facts label lists the amount of sodium in one serving of the food. If you eat more than one serving, you must  "multiply the listed amount of sodium by the number of servings.  Choose foods with less than 140 mg of sodium per serving.  Avoid foods with 300 mg of sodium or more per serving.  Shopping    Look for lower-sodium products, often labeled as \"low-sodium\" or \"no salt added.\"  Always check the sodium content, even if foods are labeled as \"unsalted\" or \"no salt added.\"  Buy fresh foods.  Avoid canned foods and pre-made or frozen meals.  Avoid canned, cured, or processed meats.  Buy breads that have less than 80 mg of sodium per slice.  Cooking    Eat more home-cooked food and less restaurant, buffet, and fast food.  Avoid adding salt when cooking. Use salt-free seasonings or herbs instead of table salt or sea salt. Check with your health care provider or pharmacist before using salt substitutes.  Cook with plant-based oils, such as canola, sunflower, or olive oil.  Meal planning  When eating at a restaurant, ask that your food be prepared with less salt or no salt, if possible. Avoid dishes labeled as brined, pickled, cured, smoked, or made with soy sauce, miso, or teriyaki sauce.  Avoid foods that contain MSG (monosodium glutamate). MSG is sometimes added to Chinese food, bouillon, and some canned foods.  Make meals that can be grilled, baked, poached, roasted, or steamed. These are generally made with less sodium.  General information  Most people on this plan should limit their sodium intake to 1,500-2,000 mg (milligrams) of sodium each day.  What foods should I eat?  Fruits  Fresh, frozen, or canned fruit. Fruit juice.  Vegetables  Fresh or frozen vegetables. \"No salt added\" canned vegetables. \"No salt added\" tomato sauce and paste. Low-sodium or reduced-sodium tomato and vegetable juice.  Grains  Low-sodium cereals, including oats, puffed wheat and rice, and shredded wheat. Low-sodium crackers. Unsalted rice. Unsalted pasta. Low-sodium bread. Whole-grain breads and whole-grain pasta.  Meats and other " proteins  Fresh or frozen (no salt added) meat, poultry, seafood, and fish. Low-sodium canned tuna and salmon. Unsalted nuts. Dried peas, beans, and lentils without added salt. Unsalted canned beans. Eggs. Unsalted nut butters.  Dairy  Milk. Soy milk. Cheese that is naturally low in sodium, such as ricotta cheese, fresh mozzarella, or Swiss cheese. Low-sodium or reduced-sodium cheese. Cream cheese. Yogurt.  Seasonings and condiments  Fresh and dried herbs and spices. Salt-free seasonings. Low-sodium mustard and ketchup. Sodium-free salad dressing. Sodium-free light mayonnaise. Fresh or refrigerated horseradish. Lemon juice. Vinegar.  Other foods  Homemade, reduced-sodium, or low-sodium soups. Unsalted popcorn and pretzels. Low-salt or salt-free chips.  The items listed above may not be a complete list of foods and beverages you can eat. Contact a dietitian for more information.  What foods should I avoid?  Vegetables  Sauerkraut, pickled vegetables, and relishes. Olives. French fries. Onion rings. Regular canned vegetables (not low-sodium or reduced-sodium). Regular canned tomato sauce and paste (not low-sodium or reduced-sodium). Regular tomato and vegetable juice (not low-sodium or reduced-sodium). Frozen vegetables in sauces.  Grains  Instant hot cereals. Bread stuffing, pancake, and biscuit mixes. Croutons. Seasoned rice or pasta mixes. Noodle soup cups. Boxed or frozen macaroni and cheese. Regular salted crackers. Self-rising flour.  Meats and other proteins  Meat or fish that is salted, canned, smoked, spiced, or pickled. Precooked or cured meat, such as sausages or meat loaves. Ashby. Ham. Pepperoni. Hot dogs. Corned beef. Chipped beef. Salt pork. Jerky. Pickled herring. Anchovies and sardines. Regular canned tuna. Salted nuts.  Dairy  Processed cheese and cheese spreads. Hard cheeses. Cheese curds. Blue cheese. Feta cheese. String cheese. Regular cottage cheese. Buttermilk. Canned milk.  Fats and  oils  Salted butter. Regular margarine. Ghee. Ashby fat.  Seasonings and condiments  Onion salt, garlic salt, seasoned salt, table salt, and sea salt. Canned and packaged gravies. Worcestershire sauce. Tartar sauce. Barbecue sauce. Teriyaki sauce. Soy sauce, including reduced-sodium. Steak sauce. Fish sauce. Oyster sauce. Cocktail sauce. Horseradish that you find on the shelf. Regular ketchup and mustard. Meat flavorings and tenderizers. Bouillon cubes. Hot sauce. Pre-made or packaged marinades. Pre-made or packaged taco seasonings. Relishes. Regular salad dressings. Salsa.  Other foods  Salted popcorn and pretzels. Corn chips and puffs. Potato and tortilla chips. Canned or dried soups. Pizza. Frozen entrees and pot pies.  The items listed above may not be a complete list of foods and beverages you should avoid. Contact a dietitian for more information.  Summary  Eating less sodium can help lower your blood pressure, reduce swelling, and protect your heart, liver, and kidneys.  Most people on this plan should limit their sodium intake to 1,500-2,000 mg (milligrams) of sodium each day.  Canned, boxed, and frozen foods are high in sodium. Restaurant foods, fast foods, and pizza are also very high in sodium. You also get sodium by adding salt to food.  Try to cook at home, eat more fresh fruits and vegetables, and eat less fast food and canned, processed, or prepared foods.  This information is not intended to replace advice given to you by your health care provider. Make sure you discuss any questions you have with your health care provider.  Document Revised: 01/22/2021 Document Reviewed: 11/18/2020  Elsevier Patient Education © 2023 [employer]

## 2023-10-26 LAB
ALBUMIN SERPL-MCNC: 4.3 G/DL (ref 3.8–4.9)
ALBUMIN/GLOB SERPL: 1.5 {RATIO} (ref 1.2–2.2)
ALP SERPL-CCNC: 62 IU/L (ref 44–121)
ALT SERPL-CCNC: 16 IU/L (ref 0–44)
AST SERPL-CCNC: 26 IU/L (ref 0–40)
BASOPHILS # BLD AUTO: 0.1 X10E3/UL (ref 0–0.2)
BASOPHILS NFR BLD AUTO: 1 %
BILIRUB SERPL-MCNC: 0.8 MG/DL (ref 0–1.2)
BUN SERPL-MCNC: 14 MG/DL (ref 6–24)
BUN/CREAT SERPL: 13 (ref 9–20)
CALCIUM SERPL-MCNC: 9.7 MG/DL (ref 8.7–10.2)
CHLORIDE SERPL-SCNC: 104 MMOL/L (ref 96–106)
CHOLEST SERPL-MCNC: 232 MG/DL (ref 100–199)
CK SERPL-CCNC: 137 U/L (ref 41–331)
CO2 SERPL-SCNC: 22 MMOL/L (ref 20–29)
CREAT SERPL-MCNC: 1.05 MG/DL (ref 0.76–1.27)
EGFRCR SERPLBLD CKD-EPI 2021: 84 ML/MIN/1.73
EOSINOPHIL # BLD AUTO: 0.3 X10E3/UL (ref 0–0.4)
EOSINOPHIL NFR BLD AUTO: 3 %
ERYTHROCYTE [DISTWIDTH] IN BLOOD BY AUTOMATED COUNT: 13.2 % (ref 11.6–15.4)
FOLATE SERPL-MCNC: 6.9 NG/ML
GLOBULIN SER CALC-MCNC: 2.9 G/DL (ref 1.5–4.5)
GLUCOSE SERPL-MCNC: 122 MG/DL (ref 70–99)
HBA1C MFR BLD: 5.4 % (ref 4.8–5.6)
HCT VFR BLD AUTO: 45.8 % (ref 37.5–51)
HDLC SERPL-MCNC: 35 MG/DL
HGB BLD-MCNC: 15.4 G/DL (ref 13–17.7)
IMM GRANULOCYTES # BLD AUTO: 0 X10E3/UL (ref 0–0.1)
IMM GRANULOCYTES NFR BLD AUTO: 0 %
LDLC SERPL CALC-MCNC: 151 MG/DL (ref 0–99)
LYMPHOCYTES # BLD AUTO: 2.4 X10E3/UL (ref 0.7–3.1)
LYMPHOCYTES NFR BLD AUTO: 24 %
MCH RBC QN AUTO: 32.5 PG (ref 26.6–33)
MCHC RBC AUTO-ENTMCNC: 33.6 G/DL (ref 31.5–35.7)
MCV RBC AUTO: 97 FL (ref 79–97)
MONOCYTES # BLD AUTO: 0.8 X10E3/UL (ref 0.1–0.9)
MONOCYTES NFR BLD AUTO: 7 %
NEUTROPHILS # BLD AUTO: 6.6 X10E3/UL (ref 1.4–7)
NEUTROPHILS NFR BLD AUTO: 65 %
PLATELET # BLD AUTO: 285 X10E3/UL (ref 150–450)
POTASSIUM SERPL-SCNC: 4.2 MMOL/L (ref 3.5–5.2)
PROT SERPL-MCNC: 7.2 G/DL (ref 6–8.5)
RBC # BLD AUTO: 4.74 X10E6/UL (ref 4.14–5.8)
SODIUM SERPL-SCNC: 141 MMOL/L (ref 134–144)
T4 FREE SERPL-MCNC: 1.22 NG/DL (ref 0.82–1.77)
TRIGL SERPL-MCNC: 249 MG/DL (ref 0–149)
TSH SERPL DL<=0.005 MIU/L-ACNC: 1.38 UIU/ML (ref 0.45–4.5)
VIT B12 SERPL-MCNC: 429 PG/ML (ref 232–1245)
VLDLC SERPL CALC-MCNC: 46 MG/DL (ref 5–40)
WBC # BLD AUTO: 10.2 X10E3/UL (ref 3.4–10.8)

## 2023-10-26 NOTE — PROGRESS NOTES
Cholesterol higher than last time.  I can see that cardiology has already started a cholesterol-lowering medication.  Please follow-up with cardiology as planned.

## 2023-10-26 NOTE — TELEPHONE ENCOUNTER
Caller: Cecil Fermin    Relationship: Self    Best call back number: [phone]      What was the call regarding: PT HAS GOTTEN SOME [employer] ([employer] MESSAGES AND HE WANTED TO MAKE SURE THESE WEREN'T FROM OUR OFFICE TRYING TO SCHD HIS MRI. HE HAD ONE ON 10/25 BUT COULD NOT COMPLETE DUE TO BEING claustrophobic. PLEASE ADVISE PT ON NEW DATE/ TIME OF OPEN MRI.     Is it okay if the provider responds through [facility]: CALL

## 2023-10-27 RX ORDER — LOSARTAN POTASSIUM AND HYDROCHLOROTHIAZIDE 12.5; 5 MG/1; MG/1
1 TABLET ORAL DAILY
Qty: 90 TABLET | Refills: 1 | Status: SHIPPED | OUTPATIENT
Start: 2023-10-27

## 2023-10-30 ENCOUNTER — TELEPHONE (OUTPATIENT)
Dept: CARDIOLOGY | Facility: CLINIC | Age: 53
End: 2023-10-30
Payer: COMMERCIAL

## 2023-10-30 RX ORDER — AMLODIPINE BESYLATE 5 MG/1
5 TABLET ORAL DAILY
Qty: 90 TABLET | Refills: 3 | Status: SHIPPED | OUTPATIENT
Start: 2023-10-30

## 2023-10-30 NOTE — TELEPHONE ENCOUNTER
Patient called with poorly controlled blood pressure.  Blood pressure 190/85.  Pulse in the 80s as per patient.  Asymptomatic.  Blood pressure in the clinic was much higher and started then on Edarbiclor 40-12.5.  We will start patient today on amlodipine 5 mg 1 tab daily in view of poor control of blood pressure.  After repeat labs, consider uptitrating ARB - diuretic versus adding spironolactone.  Patient advised to go to the emergency room if chest pain, shortness of breath, dizziness, lightheadedness, weakness in arm/leg/speech problems.  Patient to call us back if blood pressure alisa elevated or any concerns.

## 2023-10-31 ENCOUNTER — TELEPHONE (OUTPATIENT)
Dept: CARDIOLOGY | Facility: CLINIC | Age: 53
End: 2023-10-31
Payer: COMMERCIAL

## 2023-10-31 NOTE — TELEPHONE ENCOUNTER
Spoke with PT over the phone and told hi CT scan of the head with contrast was negative.  He did not have a stroke.  The symptoms he experienced though are likely related to his very high blood pressure.  So for now blood pressure control as planned.     PT also stated his BP is 196/114. I advised the PT to goto the ER if his BP doesn't go down in 30 mins due to just taking his BP medication. Patient verbalized his understanding and was agreeable.

## 2023-10-31 NOTE — TELEPHONE ENCOUNTER
----- Message from Jamel Holder MD sent at 10/31/2023 10:04 AM EDT -----  Please let patient know his CT scan of the head with contrast was negative.  He did not have a stroke.  The symptoms he experienced though are likely related to his very high blood pressure.  So for now blood pressure control as planned.

## 2023-11-03 ENCOUNTER — TELEPHONE (OUTPATIENT)
Dept: CARDIOLOGY | Facility: CLINIC | Age: 53
End: 2023-11-03

## 2023-11-03 ENCOUNTER — OFFICE VISIT (OUTPATIENT)
Dept: CARDIOLOGY | Facility: CLINIC | Age: 53
End: 2023-11-03
Payer: COMMERCIAL

## 2023-11-03 VITALS
OXYGEN SATURATION: 96 % | HEIGHT: 70 IN | DIASTOLIC BLOOD PRESSURE: 99 MMHG | WEIGHT: 189 LBS | HEART RATE: 100 BPM | BODY MASS INDEX: 27.06 KG/M2 | SYSTOLIC BLOOD PRESSURE: 160 MMHG | RESPIRATION RATE: 18 BRPM

## 2023-11-03 DIAGNOSIS — Z91.89 CHRONIC CHEST PAIN WITH HIGH RISK FOR CAD: Primary | ICD-10-CM

## 2023-11-03 DIAGNOSIS — G89.29 CHRONIC CHEST PAIN WITH HIGH RISK FOR CAD: Primary | ICD-10-CM

## 2023-11-03 DIAGNOSIS — E78.00 PURE HYPERCHOLESTEROLEMIA: ICD-10-CM

## 2023-11-03 DIAGNOSIS — R07.9 CHRONIC CHEST PAIN WITH HIGH RISK FOR CAD: Primary | ICD-10-CM

## 2023-11-03 DIAGNOSIS — G45.9 TIA (TRANSIENT ISCHEMIC ATTACK): ICD-10-CM

## 2023-11-03 DIAGNOSIS — I10 PRIMARY HYPERTENSION: ICD-10-CM

## 2023-11-03 DIAGNOSIS — F17.200 SMOKING: ICD-10-CM

## 2023-11-03 DIAGNOSIS — I73.9 PVD (PERIPHERAL VASCULAR DISEASE) WITH CLAUDICATION: ICD-10-CM

## 2023-11-03 NOTE — TELEPHONE ENCOUNTER
----- Message from Jamel Holder MD sent at 11/2/2023  3:54 PM EDT -----  Please let patient know his echo shows normal cardiac function, but mild increase and wall thickness, increase in upper chambers size and mild heart stiffening stiffening likely related to high blood pressure.  Treatment for this condition is medical with aggressive blood pressure lowering medications.

## 2023-11-03 NOTE — PROGRESS NOTES
[facility] [place]  [facility] CARDIOLOGY  [address] [place] [address]  Dept: [phone]  Dept Fax: [phone]    Date: 11/03/2023  Patient: Cecil Fermin  YOB: 1970    Follow Up Office Visit Note    Interval Follow-up  Mr. Cecil Fermin is a 53 y.o. male who is here for follow-up on Hypertension and Dizziness.  Patient misunderstood instructions, and was replacing blood pressure medications instead of adding them on, so now taking only amlodipine 5 mg 1 tab daily.    Subjective   Patient doing well.  Infrequent dizzy spell and headaches.  His blood pressure when supine usually in the 130s over 80s, but when stands up and starts walking around goes much higher.  Denies angina, orthopnea, PND, syncope.    The following portions of the patient's history were reviewed and updated as appropriate: allergies, current medications, past family history, past medical history, past social history, past surgical history, and problem list.    Medications: No Known Allergies   Current Outpatient Medications   Medication Instructions    amLODIPine (NORVASC) 5 mg, Oral, Daily    losartan-hydrochlorothiazide (Hyzaar) 50-12.5 MG per tablet 1 tablet, Oral, Daily    ondansetron ODT (ZOFRAN-ODT) 8 mg, Translingual, Every 8 Hours PRN    propranolol LA (INDERAL LA) 60 mg, Oral, Daily    rosuvastatin (CRESTOR) 5 mg, Oral, Daily    tiotropium bromide-olodaterol (Stiolto Respimat) 2.5-2.5 MCG/ACT aerosol solution inhaler 2 puffs, Inhalation, Daily - RT, 3 inhalers, 3 month supply       Tobacco Use: High Risk (11/3/2023)    Patient History     Smoking Tobacco Use: Every Day     Smokeless Tobacco Use: Never     Passive Exposure: Not on file          Cardiovascular Procedures    ECHO/MUGA: Results for orders placed in visit on 11/02/23    Adult Transthoracic Echo Complete W/ Cont if Necessary Per Protocol    Interpretation Summary    Normal EF normal LV size and function, ejection  "fraction estimated by 2D, 56 - 60%.    Left ventricular wall thickness is consistent with mild concentric hypertrophy.    The left atrial cavity is mildly dilated.    The right atrial cavity is borderline dilated.    Trace aortic valve regurgitation is present    Left ventricular diastolic function is consistent with (grade I) impaired relaxation.    Mild dilation of the aortic root is present. The aortic root measures 4.2 cm.     STRESS TESTS: No results found for this or any previous visit.     CARDIAC CATH: No results found for this or any previous visit.     DEVICES: No valid procedures specified.   HOLTER: No results found for this or any previous visit.     CT/MRI:  No results found for this or any previous visit.    VASCULAR: No valid procedures specified.      Objective  Vitals:    11/03/23 1309   BP: 160/99   BP Location: Right arm   Patient Position: Sitting   Cuff Size: Adult   Pulse: 100   Resp: 18   SpO2: 96%   Weight: 85.7 kg (189 lb)   Height: 177.8 cm (70\")   PainSc: 0-No pain              Physical Exam  Constitutional:       Appearance: Healthy appearance. Not in distress.   Eyes:      Pupils: Pupils are equal, round, and reactive to light.   Neck:      Vascular: No JVR. JVD normal.   Pulmonary:      Effort: Pulmonary effort is normal.      Breath sounds: Normal breath sounds. No wheezing. No rhonchi. No rales.   Chest:      Chest wall: Not tender to palpatation.   Cardiovascular:      PMI at left midclavicular line. Normal rate. Regular rhythm. Normal S1 with normal intensity. Normal S2 with normal intensity.       Murmurs: There is no murmur.      No gallop.  No click. No rub.   Pulses:     Intact distal pulses.   Edema:     Peripheral edema absent.   Abdominal:      General: There is no abdominal bruit.   Skin:     General: Skin is warm.   Neurological:      Mental Status: Alert and oriented to person, place and time.          Diagnostic Data  Lab Results   Component Value Date     " "10/25/2023    K 4.2 10/25/2023     10/25/2023    CO2 22 10/25/2023    BUN 14 10/25/2023    CREATININE 1.05 10/25/2023    CALCIUM 9.7 10/25/2023    BILITOT 0.8 10/25/2023    ALKPHOS 62 10/25/2023    ALT 16 10/25/2023    AST 26 10/25/2023    GLUCOSE 122 (H) 10/25/2023    ALBUMIN 4.3 10/25/2023     Lab Results   Component Value Date    WBC 10.2 10/25/2023    HGB 15.4 10/25/2023    HCT 45.8 10/25/2023     10/25/2023     No results found for: \"APTT\", \"INR\", \"PTT\"  Lab Results   Component Value Date    CKTOTAL 137 10/25/2023    CKTOTAL 100 01/20/2023     No results found for: \"BNP\"    Lab Results   Component Value Date    TRIG 249 (H) 10/25/2023    HDL 35 (L) 10/25/2023     (H) 10/25/2023     Lab Results   Component Value Date    TSH 1.380 10/25/2023    FREET4 1.22 10/25/2023       The 10-year ASCVD risk score (Krishan CERVANTES, et al., 2019) is: 27.1%    Values used to calculate the score:      Age: 53 years      Sex: Male      Is Non- : No      Diabetic: No      Tobacco smoker: Yes      Systolic Blood Pressure: 160 mmHg      Is BP treated: Yes      HDL Cholesterol: 35 mg/dL      Total Cholesterol: 232 mg/dL     CV Diagnostics:  Procedures    ECHO/MUGA: Results for orders placed in visit on 11/02/23    Adult Transthoracic Echo Complete W/ Cont if Necessary Per Protocol    Interpretation Summary    Normal EF normal LV size and function, ejection fraction estimated by 2D, 56 - 60%.    Left ventricular wall thickness is consistent with mild concentric hypertrophy.    The left atrial cavity is mildly dilated.    The right atrial cavity is borderline dilated.    Trace aortic valve regurgitation is present    Left ventricular diastolic function is consistent with (grade I) impaired relaxation.    Mild dilation of the aortic root is present. The aortic root measures 4.2 cm.     STRESS TESTS: No results found for this or any previous visit.     CARDIAC CATH: No results found for this or any " previous visit.     DEVICES: No valid procedures specified.   HOLTER: No results found for this or any previous visit.     CT/MRI:  No results found for this or any previous visit.    VASCULAR: No valid procedures specified.     Assessment and Plan  Chronic chest pain with high risk for CAD  Patient with significant risk factors (age, male gender, hypertension, dyslipidemia, heavy smoking, poor diet), had an episode of chest pain followed by fall from a ladder.   No recurrence of chest pains since last visit.  Blood pressure still poorly controlled as patient was taking 1 medication only at a time, echo was normal EF mild LVH and grade 1 suggestive of hypertensive heart disease.  Patient instructed to restart all medications progressively, so today the beta-blocker and tomorrow restart ARB and diuretic  Monitor home blood pressure and if significant drop patient call me back  Low-salt diet reinforced.    TIA (transient ischemic attack)  CT of the head negative.  MRI poorly tolerated by patient due to claustrophobia.  Dizzy spells and headaches still occurring intermittently, usually concomitant of his high blood pressure events.  Aggressive blood pressure control as above  No need for further cardiac work-up as of now  Continue statin and consider aspirin once blood pressure better controlled    Primary hypertension   blood pressure better controlled but still not to goal. Medication management as above.    Pure hypercholesterolemia  Continue Crestor as above.  Monitor lipid profile fasting.  Exercise, heart healthy diet, and weight loss strongly encouraged.    Smoking  He reports that he has been smoking cigarettes. He started smoking about 41 years ago. He has a 60 pack-year smoking history. He has never used smokeless tobacco. I have educated him on the risk of diseases from using tobacco products such as cancer, COPD, heart disease, and stroke. I advised him to quit again this visit and progressing.  He will  follow up with me in 1 month or sooner to check on his progress.     PVD (peripheral vascular disease) with claudication   Test done yesterday showed some inconsistencies, so had to be repeated.  We will follow-up on the test today.    Continue rosuvastatin  If NICOLAS abnormal, we will start aspirin as soon as the blood pressure better controlled.    No orders of the defined types were placed in this encounter.    Return in about 1 month (around 12/3/2023) for Next scheduled follow up.    Jamel Holder MD

## 2023-11-06 ENCOUNTER — TELEPHONE (OUTPATIENT)
Dept: CARDIOLOGY | Facility: CLINIC | Age: 53
End: 2023-11-06
Payer: COMMERCIAL

## 2023-11-06 NOTE — TELEPHONE ENCOUNTER
----- Message from Jamel Holder MD sent at 11/3/2023  3:04 PM EDT -----  Please let patient know his NICOLAS was normal.  I am glad we repeated it since the first one was very abnormal, test error.

## 2023-11-15 ENCOUNTER — TELEPHONE (OUTPATIENT)
Dept: CARDIOLOGY | Facility: CLINIC | Age: 53
End: 2023-11-15
Payer: COMMERCIAL

## 2023-11-15 NOTE — TELEPHONE ENCOUNTER
HUB TRANSFERRED - WAS NOT ABLE TO SPEAK WITH PT, DISCONNECTED - HUB SAID HE IS HAVING BP PROBLEMS - 199/112- HAD CONCERN WITH MEDICATION AS WELL.

## 2023-11-15 NOTE — TELEPHONE ENCOUNTER
Pt will call us back and please let me speak with him 199/112- HAD CONCERN WITH MEDICATION AS WELL.

## 2023-12-14 ENCOUNTER — HOSPITAL ENCOUNTER (OUTPATIENT)
Dept: CT IMAGING | Facility: HOSPITAL | Age: 53
Discharge: HOME | End: 2023-12-14
Payer: COMMERCIAL

## 2023-12-14 DIAGNOSIS — Z87.891 PERSONAL HISTORY OF TOBACCO USE, PRESENTING HAZARDS TO HEALTH: ICD-10-CM

## 2023-12-14 PROCEDURE — 71271 CT THORAX LUNG CANCER SCR C-: CPT

## 2024-02-19 ENCOUNTER — TELEPHONE (OUTPATIENT)
Dept: FAMILY MEDICINE CLINIC | Facility: CLINIC | Age: 54
End: 2024-02-19
Payer: COMMERCIAL

## 2024-02-19 NOTE — TELEPHONE ENCOUNTER
PT CALLED STATING HE BELIEVES HE HAD A HEART ATTACK TWO DAYS AGO, HE STATES HIS CHEST WAS IN EXTREME PAIN AND THAT HE HAD RIGHT ARM PAIN AND IT HAD GONE NUMB, PT STATED HIS BLOOD PRESSURE /114. I ADVISED PT TO GO TO THE EMERGENCY ROOM MULTIPLE TIMES AND PT REFUSED AND STATED HE DID NOT WANT TOO BECAUSE THEY TAKE TOO LONG.

## 2024-02-21 ENCOUNTER — OFFICE VISIT (OUTPATIENT)
Dept: FAMILY MEDICINE CLINIC | Facility: CLINIC | Age: 54
End: 2024-02-21
Payer: COMMERCIAL

## 2024-02-21 VITALS
RESPIRATION RATE: 16 BRPM | BODY MASS INDEX: 27.22 KG/M2 | DIASTOLIC BLOOD PRESSURE: 100 MMHG | SYSTOLIC BLOOD PRESSURE: 153 MMHG | WEIGHT: 190.1 LBS | HEART RATE: 68 BPM | HEIGHT: 70 IN | OXYGEN SATURATION: 98 %

## 2024-02-21 DIAGNOSIS — R04.2 HEMOPTYSIS: ICD-10-CM

## 2024-02-21 DIAGNOSIS — Z12.11 SCREENING FOR MALIGNANT NEOPLASM OF COLON: ICD-10-CM

## 2024-02-21 DIAGNOSIS — R07.9 CHEST PAIN, UNSPECIFIED TYPE: ICD-10-CM

## 2024-02-21 DIAGNOSIS — K21.9 GASTROESOPHAGEAL REFLUX DISEASE, UNSPECIFIED WHETHER ESOPHAGITIS PRESENT: ICD-10-CM

## 2024-02-21 DIAGNOSIS — E78.2 HYPERLIPIDEMIA, MIXED: ICD-10-CM

## 2024-02-21 DIAGNOSIS — I10 HYPERTENSION, ESSENTIAL: Primary | ICD-10-CM

## 2024-02-21 DIAGNOSIS — R73.9 HYPERGLYCEMIA: ICD-10-CM

## 2024-02-21 RX ORDER — RAMIPRIL 5 MG/1
5 CAPSULE ORAL DAILY
Qty: 90 CAPSULE | Refills: 1 | Status: SHIPPED | OUTPATIENT
Start: 2024-02-21

## 2024-02-21 RX ORDER — PROPRANOLOL HCL 60 MG
60 CAPSULE, EXTENDED RELEASE 24HR ORAL DAILY
Qty: 90 CAPSULE | Refills: 1 | Status: SHIPPED | OUTPATIENT
Start: 2024-02-21

## 2024-02-21 NOTE — PROGRESS NOTES
Patient Name: Cecil Fermin  : 1970   MRN: [id number]     Chief Complaint:    Chief Complaint   Patient presents with    Annual Exam    Heartburn       History of Present Illness: Cecil Fermin is a 53 y.o. male who is here today for follow up on chest pain  HPI        Review of Systems:   Review of Systems   Constitutional: Negative.    HENT: Negative.     Eyes: Negative.    Respiratory: Negative.     Cardiovascular: Negative.    Gastrointestinal: Negative.    Neurological: Negative.         Past Medical History:   Past Medical History:   Diagnosis Date    Alcoholism     COVID-19     X2-has had shortness of breath since last infection       Past Surgical History: No past surgical history on file.    Family History:   Family History   Problem Relation Age of Onset    Ovarian cancer Mother          age 59    Lung cancer Father          age 47    Lung cancer Sister     Ovarian cancer Sister     Pneumonia Brother          age 39 from COVID-pneumonia    Pneumonia Brother          age 56 from COVID-pneumonia    Pneumonia Maternal Grandmother          age 57 from COVID-pneumonia       Social History:   Social History     Socioeconomic History    Marital status:    Tobacco Use    Smoking status: Every Day     Packs/day: 3.00     Years: 20.00     Additional pack years: 0.00     Total pack years: 60.00     Types: Cigarettes     Start date:     Smokeless tobacco: Never   Vaping Use    Vaping Use: Never used   Substance and Sexual Activity    Alcohol use: Not Currently     Comment: patient is a recovering alc- every now then    Drug use: Yes     Types: Marijuana    Sexual activity: Not Currently       Medications:     Current Outpatient Medications:     amLODIPine (NORVASC) 5 MG tablet, Take 1 tablet by mouth Daily., Disp: 90 tablet, Rfl: 3    propranolol LA (Inderal LA) 60 MG 24 hr capsule, Take 1 capsule by mouth Daily., Disp: 90 capsule, Rfl: 1    rosuvastatin  "(CRESTOR) 5 MG tablet, Take 1 tablet by mouth Daily., Disp: 30 tablet, Rfl: 11    ramipril (Altace) 5 MG capsule, Take 1 capsule by mouth Daily., Disp: 90 capsule, Rfl: 1    tiotropium bromide-olodaterol (Stiolto Respimat) 2.5-2.5 MCG/ACT aerosol solution inhaler, Inhale 2 puffs Daily. 3 inhalers, 3 month supply (Patient not taking: Reported on 11/3/2023), Disp: 24 g, Rfl: 1    Allergies:   No Known Allergies      Physical Exam:  Vital Signs:   Vitals:    02/21/24 0946   BP: 153/100   BP Location: Left arm   Patient Position: Sitting   Cuff Size: Large Adult   Pulse: 68   Resp: 16   SpO2: 98%   Weight: 86.2 kg (190 lb 1.6 oz)   Height: 177.8 cm (70\")   PainSc: 0-No pain     Body mass index is 27.28 kg/m².     Physical Exam  Vitals and nursing note reviewed.   Constitutional:       Appearance: Normal appearance. He is normal weight.   HENT:      Head: Normocephalic and atraumatic.      Right Ear: Tympanic membrane, ear canal and external ear normal.      Left Ear: Tympanic membrane, ear canal and external ear normal.      Nose: Nose normal.      Mouth/Throat:      Mouth: Mucous membranes are dry.      Pharynx: Oropharynx is clear.   Eyes:      Extraocular Movements: Extraocular movements intact.      Conjunctiva/sclera: Conjunctivae normal.      Pupils: Pupils are equal, round, and reactive to light.   Cardiovascular:      Rate and Rhythm: Normal rate and regular rhythm.      Pulses: Normal pulses.      Heart sounds: Normal heart sounds.   Pulmonary:      Effort: Pulmonary effort is normal.      Breath sounds: Normal breath sounds.   Musculoskeletal:      Cervical back: Normal range of motion and neck supple.   Feet:      Comments:      Neurological:      Mental Status: He is alert.           ECG 12 Lead    Date/Time: 2/21/2024 10:10 AM  Performed by: Buddy Vargas MD    Authorized by: Buddy Vargas MD  Comparison: compared with previous ECG from 10/25/2023  Comparison to previous ECG: No change  Rhythm: " sinus rhythm  Rate: normal  Conduction: conduction normal  QRS axis: normal  Other findings: non-specific ST-T wave changes    Clinical impression: non-specific ECG  Comments: No sig change, no acute sign of ischemia            Assessment/Plan:   Diagnoses and all orders for this visit:    1. Hypertension, essential (Primary)  Assessment & Plan:  Going to add ramipril to his medications.    Orders:  -     propranolol LA (Inderal LA) 60 MG 24 hr capsule; Take 1 capsule by mouth Daily.  Dispense: 90 capsule; Refill: 1    2. Hyperlipidemia, mixed  Assessment & Plan:  Continue meds and follow-up with Ms. Mckenzie.      3. Hyperglycemia    4. Chest pain, unspecified type  Assessment & Plan:  Patient having a little bit of chest pain.  This is intermittent in nature.  EKG has not changed.  In order to be thorough, and have him get a stress test as she get a significant history of tobacco use and high blood pressure.    Orders:  -     Treadmill Stress Test; Future    5. Hemoptysis  Assessment & Plan:  Patient had a CT scan of his chest in December.  This did not show any abnormality.  In order to be complete I am going to send him for an EGD/colonoscopy and to ENT for nasopharyngoscopy.    Orders:  -     Ambulatory Referral to ENT (Otolaryngology)    6. Screening for malignant neoplasm of colon  -     Ambulatory Referral to Gastroenterology    7. Gastroesophageal reflux disease, unspecified whether esophagitis present  -     Ambulatory Referral to Gastroenterology    Other orders  -     ECG 12 Lead  -     ramipril (Altace) 5 MG capsule; Take 1 capsule by mouth Daily.  Dispense: 90 capsule; Refill: 1             Follow Up:   No follow-ups on file.      Buddy Vargas MD  [facility]

## 2024-02-21 NOTE — ASSESSMENT & PLAN NOTE
Patient having a little bit of chest pain.  This is intermittent in nature.  EKG has not changed.  In order to be thorough, and have him get a stress test as she get a significant history of tobacco use and high blood pressure.

## 2024-02-21 NOTE — ASSESSMENT & PLAN NOTE
Patient had a CT scan of his chest in December.  This did not show any abnormality.  In order to be complete I am going to send him for an EGD/colonoscopy and to ENT for nasopharyngoscopy.

## 2024-03-04 ENCOUNTER — OFFICE VISIT (OUTPATIENT)
Dept: FAMILY MEDICINE CLINIC | Facility: CLINIC | Age: 54
End: 2024-03-04
Payer: COMMERCIAL

## 2024-03-04 VITALS
DIASTOLIC BLOOD PRESSURE: 82 MMHG | HEART RATE: 50 BPM | BODY MASS INDEX: 28.79 KG/M2 | HEIGHT: 68 IN | SYSTOLIC BLOOD PRESSURE: 140 MMHG | OXYGEN SATURATION: 95 % | RESPIRATION RATE: 15 BRPM | WEIGHT: 190 LBS

## 2024-03-04 DIAGNOSIS — R07.9 CHEST PAIN, UNSPECIFIED TYPE: ICD-10-CM

## 2024-03-04 DIAGNOSIS — R06.09 EXERTIONAL DYSPNEA: Primary | ICD-10-CM

## 2024-03-04 DIAGNOSIS — Z87.891 PERSONAL HISTORY OF TOBACCO USE: ICD-10-CM

## 2024-03-04 PROCEDURE — 3079F DIAST BP 80-89 MM HG: CPT | Performed by: PHYSICIAN ASSISTANT

## 2024-03-04 PROCEDURE — 99214 OFFICE O/P EST MOD 30 MIN: CPT | Performed by: PHYSICIAN ASSISTANT

## 2024-03-04 PROCEDURE — 3077F SYST BP >= 140 MM HG: CPT | Performed by: PHYSICIAN ASSISTANT

## 2024-03-04 NOTE — PROGRESS NOTES
Patient Office Visit      Patient Name: Cecil Fermin  : 1970   MRN: [id number]     Chief Complaint:    Chief Complaint   Patient presents with    Shortness of Breath       History of Present Illness: Cecil Fermin is a 53 y.o. male who is here today for follow-up.  Patient here with his wife to review stress test results.  Cardiology contacted us regarding severe shortness of breath and patient not able to obtain maximum heart rate and suggested pulmonary function testing.  Patient has already had a low-dose chest CT recently which was lung RADS L1.  Patient admits to smoking 3 packs of cigarettes per day.    Subjective      Review of Systems:         Past Medical History:   Past Medical History:   Diagnosis Date    Alcoholism     COVID-19     X2-has had shortness of breath since last infection       Past Surgical History: History reviewed. No pertinent surgical history.    Family History:   Family History   Problem Relation Age of Onset    Ovarian cancer Mother          age 59    Lung cancer Father          age 47    Lung cancer Sister     Ovarian cancer Sister     Pneumonia Brother          age 39 from COVID-pneumonia    Pneumonia Brother          age 56 from COVID-pneumonia    Pneumonia Maternal Grandmother          age 57 from COVID-pneumonia       Social History:   Social History     Socioeconomic History    Marital status:    Tobacco Use    Smoking status: Every Day     Current packs/day: 3.00     Average packs/day: 3.0 packs/day for 42.2 years (126.5 ttl pk-yrs)     Types: Cigarettes     Start date:     Smokeless tobacco: Never   Vaping Use    Vaping status: Never Used   Substance and Sexual Activity    Alcohol use: Not Currently     Comment: patient is a recovering alc- every now then    Drug use: Yes     Types: Marijuana    Sexual activity: Not Currently       Allergies:   No Known Allergies    Objective     Physical Exam:  Vital Signs:   Vitals:  "   03/04/24 1059   BP: 140/82   BP Location: Left arm   Patient Position: Sitting   Cuff Size: Adult   Pulse: 50   Resp: 15   SpO2: 95%   Weight: 86.2 kg (190 lb)   Height: 172.7 cm (68\")     Body mass index is 28.89 kg/m².           Physical Exam  Constitutional:       General: He is not in acute distress.  Cardiovascular:      Rate and Rhythm: Normal rate and regular rhythm.   Pulmonary:      Effort: Pulmonary effort is normal.      Breath sounds: Normal breath sounds. No wheezing, rhonchi or rales.   Neurological:      Mental Status: He is alert and oriented to person, place, and time.         Procedures    Assessment / Plan      Assessment/Plan:   Diagnoses and all orders for this visit:    1. Exertional dyspnea (Primary)  Assessment & Plan:  Will order pulmonary function test, discussed the need to stop smoking.  There are inhalers that may help somewhat but loss lung function will not be regained.  Lung function will continue to be lost at an accelerated rate if smoking continues.    Orders:  -     Complete PFT - Pre & Post Bronchodilator; Future    2. Personal history of tobacco use  Assessment & Plan:  Discussed the need to stop smoking.  Patient contemplating but not ready to make any behavioral change at this time.    Orders:  -     Complete PFT - Pre & Post Bronchodilator; Future    3. Chest pain, unspecified type  Assessment & Plan:  Patient has appointment with cardiology, he will probably need a Cardiolite stress test and will keep follow-up appointment with cardiology.             Medications:     Current Outpatient Medications:     amLODIPine (NORVASC) 5 MG tablet, Take 1 tablet by mouth Daily., Disp: 90 tablet, Rfl: 3    propranolol LA (Inderal LA) 60 MG 24 hr capsule, Take 1 capsule by mouth Daily., Disp: 90 capsule, Rfl: 1    ramipril (Altace) 5 MG capsule, Take 1 capsule by mouth Daily., Disp: 90 capsule, Rfl: 1    rosuvastatin (CRESTOR) 5 MG tablet, Take 1 tablet by mouth Daily., Disp: 30 " tablet, Rfl: 11    tiotropium bromide-olodaterol (Stiolto Respimat) 2.5-2.5 MCG/ACT aerosol solution inhaler, Inhale 2 puffs Daily. 3 inhalers, 3 month supply, Disp: 24 g, Rfl: 1        Follow Up:   Follow-up after cardiac workup and after pulmonary function test.    Marlene Mckenzie PA-C   [facility]      NOTE TO PATIENT: The 21st Century Cures Act makes medical notes like these available to patients in the interest of transparency. However, be advised this is a medical document. It is intended as peer to peer communication. It is written in medical language and may contain abbreviations or verbiage that are unfamiliar. It may appear blunt or direct. Medical documents are intended to carry relevant information, facts as evident, and the clinical opinion of the practitioner.

## 2024-03-04 NOTE — ASSESSMENT & PLAN NOTE
Discussed the need to stop smoking.  Patient contemplating but not ready to make any behavioral change at this time.

## 2024-03-04 NOTE — ASSESSMENT & PLAN NOTE
Patient has appointment with cardiology, he will probably need a Cardiolite stress test and will keep follow-up appointment with cardiology.

## 2024-03-04 NOTE — ASSESSMENT & PLAN NOTE
Will order pulmonary function test, discussed the need to stop smoking.  There are inhalers that may help somewhat but loss lung function will not be regained.  Lung function will continue to be lost at an accelerated rate if smoking continues.

## 2024-03-14 ENCOUNTER — OFFICE VISIT (OUTPATIENT)
Dept: CARDIOLOGY | Facility: CLINIC | Age: 54
End: 2024-03-14
Payer: COMMERCIAL

## 2024-03-14 ENCOUNTER — HOSPITAL ENCOUNTER (OUTPATIENT)
Dept: PULMONOLOGY | Facility: HOSPITAL | Age: 54
Discharge: HOME | End: 2024-03-14
Admitting: PHYSICIAN ASSISTANT
Payer: COMMERCIAL

## 2024-03-14 VITALS
HEIGHT: 68 IN | SYSTOLIC BLOOD PRESSURE: 130 MMHG | DIASTOLIC BLOOD PRESSURE: 76 MMHG | HEART RATE: 75 BPM | BODY MASS INDEX: 28.58 KG/M2 | OXYGEN SATURATION: 96 % | WEIGHT: 188.6 LBS

## 2024-03-14 DIAGNOSIS — I73.9 PVD (PERIPHERAL VASCULAR DISEASE) WITH CLAUDICATION: ICD-10-CM

## 2024-03-14 DIAGNOSIS — Z87.891 PERSONAL HISTORY OF TOBACCO USE: ICD-10-CM

## 2024-03-14 DIAGNOSIS — R06.09 EXERTIONAL DYSPNEA: ICD-10-CM

## 2024-03-14 DIAGNOSIS — R07.9 CHRONIC CHEST PAIN WITH HIGH RISK FOR CAD: Primary | ICD-10-CM

## 2024-03-14 DIAGNOSIS — G45.9 TIA (TRANSIENT ISCHEMIC ATTACK): ICD-10-CM

## 2024-03-14 DIAGNOSIS — G89.29 CHRONIC CHEST PAIN WITH HIGH RISK FOR CAD: Primary | ICD-10-CM

## 2024-03-14 DIAGNOSIS — F17.200 SMOKING: ICD-10-CM

## 2024-03-14 DIAGNOSIS — Z91.89 CHRONIC CHEST PAIN WITH HIGH RISK FOR CAD: Primary | ICD-10-CM

## 2024-03-14 DIAGNOSIS — I10 HYPERTENSION, ESSENTIAL: ICD-10-CM

## 2024-03-14 DIAGNOSIS — E78.2 HYPERLIPIDEMIA, MIXED: ICD-10-CM

## 2024-03-14 PROCEDURE — 94726 PLETHYSMOGRAPHY LUNG VOLUMES: CPT

## 2024-03-14 PROCEDURE — 94060 EVALUATION OF WHEEZING: CPT

## 2024-03-14 PROCEDURE — 94729 DIFFUSING CAPACITY: CPT

## 2024-03-14 PROCEDURE — 94640 AIRWAY INHALATION TREATMENT: CPT

## 2024-03-14 RX ORDER — ASPIRIN 81 MG/1
81 TABLET ORAL DAILY
Qty: 90 TABLET | Refills: 3 | Status: SHIPPED | OUTPATIENT
Start: 2024-03-14

## 2024-03-14 RX ORDER — ROSUVASTATIN CALCIUM 10 MG/1
10 TABLET, COATED ORAL DAILY
Qty: 90 TABLET | Refills: 3 | Status: SHIPPED | OUTPATIENT
Start: 2024-03-14

## 2024-03-14 RX ORDER — ALBUTEROL SULFATE 2.5 MG/3ML
2.5 SOLUTION RESPIRATORY (INHALATION) ONCE
Status: COMPLETED | OUTPATIENT
Start: 2024-03-14 | End: 2024-03-14

## 2024-03-14 RX ADMIN — ALBUTEROL SULFATE 2.5 MG: 2.5 SOLUTION RESPIRATORY (INHALATION) at 09:38

## 2024-03-14 NOTE — PATIENT INSTRUCTIONS
[facility] [place]  [facility] CARDIOLOGY  [address]  [place] [address]  Dept: [phone]  Dept Fax: [phone]    Patient: Cecil Fermin  YOB: 1970    Time and date of the stress test : ___________________________________  Your test will be at:  [facility] cardiology  [address], [place] [id number] [phone]    Patient instructions:    Please refrain from smoking 2 - 4 hours prior to your test.    If your appointment is in the morning, do not eat breakfast.  Drink plenty of water or juice do not take diabetic medications    If your appointment is after 1230, eat a light breakfast before 9 AM.  Continue drinking water or juice and a light breakfast, but absolutely no coffee tea or soft drinks.    Drink plenty of juice or water the day of your test.  Absolutely NO coffee, tea, soft drinks, or chocolate    If you are on a beta-blocker, please hold the day before and the day of your test.  A list of some beta-blockers includes atenolol, Bystolic, bisoprolol, carvedilol, Coreg, Lopressor, metoprolol, Toprol-XL, sotalol.  Also hold some calcium channel blockers that can slow down the heart, such as Cardizem, diltiazem, verapamil.    Do not take Viagra, Cialis, or Levitra 48-hour before your test    If you test includes the treadmill, please wear comfortable clothing and exercise shoes    It is not necessary to bring anyone with you.  However if you do bring someone they are unable to go back with you during testing.  They are welcome to wait in the lobby.    Please bring a list of your current medications    Please bring your insurance cards and the picture ID with you.    Please arrive 15 minutes before your appointment.    This test will last between 2 and 2-1/2 hours     If you must cancel, please give me 24-hour notice before your appointment.    Jamel Holder MD

## 2024-03-14 NOTE — ASSESSMENT & PLAN NOTE
CT of the head negative.  MRI poorly tolerated by patient due to claustrophobia.  Dizzy spells and headaches still occurring intermittently, usually concomitant of his high blood pressure events.  No recurrence since back on his blood pressure medications.  BP controlled.  Plan:  Aggressive blood pressure control as above  No need for further cardiac work-up as of now  Uptitrate rosuvastatin 10 mg p.o. daily and add aspirin  Smoking cessation

## 2024-03-14 NOTE — ASSESSMENT & PLAN NOTE
Lipid abnormalities are stable    Plan:  Plan dosage change to the following medication/s;  uptitrate rosuvastatin to 10 mg p.o. daily since 5 mg well-tolerated, and repeat labs on higher dose.      Discussed medication dosage, use, side effects, and goals of treatment in detail.    Counseled patient on lifestyle modifications to help control hyperlipidemia.   Cholesterol lowering dietary information shared with patient.  Advised patient to exercise for 150 minutes weekly. (30 minute brisk walk, 5 days a week for example)  Weight Loss encouraged  Stop tobacco use encouraged  Heart healthy low-fat low-cholesterol diet.    Patient Treatment Goals:   LDL goal is less than 70    Followup in 6 months.

## 2024-03-14 NOTE — ASSESSMENT & PLAN NOTE
Hypertension is stable and controlled  Continue current treatment regimen.  Dietary sodium restriction.  Weight loss.  Regular aerobic exercise.  Stop smoking.  Ambulatory blood pressure monitoring.  Blood pressure will be reassessed in 3 months.

## 2024-03-14 NOTE — ASSESSMENT & PLAN NOTE
Patient with significant risk factors (age, male gender, hypertension, dyslipidemia, heavy smoking, poor diet), had an episode of chest pain followed by fall from a ladder. No recurrence of chest pains since last visit.  Blood pressure well poorly controlled with no medication related side effects.  Echo with normal EF mild LVH and grade 1 suggestive of hypertensive heart disease.  NICOLAS normal.  Plan:  Uptitrate rosuvastatin to 10 mg p.o. daily since well-tolerated  Smoking cessation discussed extensively   Continue aggressive blood pressure control with propranolol, ramipril and amlodipine  Add aspirin 81 mg daily in view of borderline abnormal stress test  Refer patient for a Lexiscan nuclear stress test  Patient counseled in regards to heart healthy, low fat/ low cholesterol/low sat diet, daily exercise for 30 minutes, low to moderate intensity, and weight loss.  Follow-up with pulmonary regarding lung disease and PFTs results

## 2024-03-14 NOTE — PROGRESS NOTES
"[facility]  [facility] CARDIOLOGY  [address] [place] [address]  Dept: [phone]  Dept Fax: [phone]    Date: 03/14/2024  Patient: Cecil Fermin  YOB: 1970    Follow Up Office Visit Note    Interval Follow-up  Mr. Cecil Fermin is a 53 y.o. male who is here for follow-up on Chest Pain.    Subjective   Patient doing well with no complaints.  Did not have any significant chest pain since last visit.  Blood pressure well-controlled.  Patient was not able to complete a stress test secondary to drop in oxygen to 91% and the exercise was submaximal, nonetheless with some EKG changes that were borderline significant.  Patient denies angina, orthopnea, PND, palpitations, lightheadedness, syncope or medications side-effects.    The following portions of the patient's history were reviewed and updated as appropriate: allergies, current medications, past family history, past medical history, past social history, past surgical history, and problem list.    Medications: No Known Allergies   Current Outpatient Medications   Medication Instructions    amLODIPine (NORVASC) 5 mg, Oral, Daily    aspirin 81 mg, Oral, Daily    propranolol LA (INDERAL LA) 60 mg, Oral, Daily    ramipril (ALTACE) 5 mg, Oral, Daily    rosuvastatin (CRESTOR) 10 mg, Oral, Daily    tiotropium bromide-olodaterol (Stiolto Respimat) 2.5-2.5 MCG/ACT aerosol solution inhaler 2 puffs, Inhalation, Daily - RT, 3 inhalers, 3 month supply       Tobacco Use: High Risk (3/14/2024)    Patient History     Smoking Tobacco Use: Every Day     Smokeless Tobacco Use: Never     Passive Exposure: Not on file        Objective  Vitals:    03/14/24 1311   BP: 130/76   BP Location: Left arm   Patient Position: Sitting   Cuff Size: Adult   Pulse: 75   SpO2: 96%   Weight: 85.5 kg (188 lb 9.6 oz)   Height: 172.7 cm (68\")   PainSc: 0-No pain      Vitals:    03/14/24 1311   BP: 130/76   BP Location: Left arm " "  Patient Position: Sitting   Cuff Size: Adult   Pulse: 75   SpO2: 96%   Weight: 85.5 kg (188 lb 9.6 oz)   Height: 172.7 cm (68\")          Physical Exam  Constitutional:       Appearance: Healthy appearance. Not in distress.   Eyes:      Pupils: Pupils are equal, round, and reactive to light.   Neck:      Vascular: No JVR. JVD normal.   Pulmonary:      Effort: Pulmonary effort is normal.      Breath sounds: Normal breath sounds. No wheezing. No rhonchi. No rales.   Chest:      Chest wall: Not tender to palpatation.   Cardiovascular:      PMI at left midclavicular line. Normal rate. Regular rhythm. Normal S1 with normal intensity. Normal S2 with normal intensity.       Murmurs: There is no murmur.      No gallop.  No click. No rub.   Pulses:     Intact distal pulses.   Edema:     Peripheral edema absent.   Abdominal:      General: There is no abdominal bruit.   Skin:     General: Skin is warm.   Neurological:      Mental Status: Alert and oriented to person, place and time.              Diagnostic Data  Lab Results   Component Value Date     10/25/2023    K 4.2 10/25/2023     10/25/2023    CO2 22 10/25/2023    BUN 14 10/25/2023    CREATININE 1.05 10/25/2023    CALCIUM 9.7 10/25/2023    BILITOT 0.8 10/25/2023    ALKPHOS 62 10/25/2023    ALT 16 10/25/2023    AST 26 10/25/2023    GLUCOSE 122 (H) 10/25/2023    ALBUMIN 4.3 10/25/2023     Lab Results   Component Value Date    WBC 10.2 10/25/2023    HGB 15.4 10/25/2023    HCT 45.8 10/25/2023     10/25/2023     No results found for: \"APTT\", \"INR\", \"PTT\"  Lab Results   Component Value Date    CKTOTAL 137 10/25/2023    CKTOTAL 100 01/20/2023     No results found for: \"BNP\", \"PROBNP\"    Lab Results   Component Value Date    CHLPL 232 (H) 10/25/2023    TRIG 249 (H) 10/25/2023    HDL 35 (L) 10/25/2023     (H) 10/25/2023     Lab Results   Component Value Date    TSH 1.380 10/25/2023    FREET4 1.22 10/25/2023       CV Diagnostics:  Procedures    CXR: No " results found for this or any previous visit.     ECHO/MUGA: Results for orders placed in visit on 11/02/23    Adult Transthoracic Echo Complete W/ Cont if Necessary Per Protocol    Interpretation Summary    Normal EF normal LV size and function, ejection fraction estimated by 2D, 56 - 60%.    Left ventricular wall thickness is consistent with mild concentric hypertrophy.    The left atrial cavity is mildly dilated.    The right atrial cavity is borderline dilated.    Trace aortic valve regurgitation is present    Left ventricular diastolic function is consistent with (grade I) impaired relaxation.    Mild dilation of the aortic root is present. The aortic root measures 4.2 cm.     STRESS TESTS: Results for orders placed in visit on 03/01/24    Treadmill Stress Test    Interpretation Summary    Findings consistent with an indeterminate ECG stress test.    Patient did not reach target heart rate (63% of maximum heart rate, goal 85%), limited by severe shortness of breath.    There was minor EKG changes during stress that did not reach significance for ischemia, limited by baseline ST-T changes.    Consider workup of shortness of breath including full PFTs or chest CT if clinically indicated    Consider a different modality to rule out CAD including Lexiscan nuclear stress test, dobutamine stress echo or coronary CTA.     CARDIAC CATH: No results found for this or any previous visit.     DEVICES: No valid procedures specified.   HOLTER: No results found for this or any previous visit.     CT/MRI:  No results found for this or any previous visit.    VASCULAR: No valid procedures specified.     Assessment and Plan  Diagnoses and all orders for this visit:    1. Chronic chest pain with high risk for CAD (Primary)  Assessment & Plan:  Patient with significant risk factors (age, male gender, hypertension, dyslipidemia, heavy smoking, poor diet), had an episode of chest pain followed by fall from a ladder. No recurrence of  chest pains since last visit.  Blood pressure well poorly controlled with no medication related side effects.  Echo with normal EF mild LVH and grade 1 suggestive of hypertensive heart disease.  NICOLAS normal.  Plan:  Uptitrate rosuvastatin to 10 mg p.o. daily since well-tolerated  Smoking cessation discussed extensively   Continue aggressive blood pressure control with propranolol, ramipril and amlodipine  Add aspirin 81 mg daily in view of borderline abnormal stress test  Refer patient for a Lexiscan nuclear stress test  Patient counseled in regards to heart healthy, low fat/ low cholesterol/low sat diet, daily exercise for 30 minutes, low to moderate intensity, and weight loss.  Follow-up with pulmonary regarding lung disease and PFTs results       Orders:  -     Stress Test With Myocardial Perfusion One Day; Future    2. Hypertension, essential  Assessment & Plan:  Hypertension is stable and controlled  Continue current treatment regimen.  Dietary sodium restriction.  Weight loss.  Regular aerobic exercise.  Stop smoking.  Ambulatory blood pressure monitoring.  Blood pressure will be reassessed in 3 months.      3. Hyperlipidemia, mixed  Assessment & Plan:   Lipid abnormalities are stable    Plan:  Plan dosage change to the following medication/s;  uptitrate rosuvastatin to 10 mg p.o. daily since 5 mg well-tolerated, and repeat labs on higher dose.      Discussed medication dosage, use, side effects, and goals of treatment in detail.    Counseled patient on lifestyle modifications to help control hyperlipidemia.   Cholesterol lowering dietary information shared with patient.  Advised patient to exercise for 150 minutes weekly. (30 minute brisk walk, 5 days a week for example)  Weight Loss encouraged  Stop tobacco use encouraged  Heart healthy low-fat low-cholesterol diet.    Patient Treatment Goals:   LDL goal is less than 70    Followup in 6 months.      4. PVD (peripheral vascular disease) with claudication    5.  Smoking  Assessment & Plan:  Smoking cessation discussed extensively with patient.  Follow-up on abstinence next visit.      6. TIA (transient ischemic attack)  Assessment & Plan:  CT of the head negative.  MRI poorly tolerated by patient due to claustrophobia.  Dizzy spells and headaches still occurring intermittently, usually concomitant of his high blood pressure events.  No recurrence since back on his blood pressure medications.  BP controlled.  Plan:  Aggressive blood pressure control as above  No need for further cardiac work-up as of now  Uptitrate rosuvastatin 10 mg p.o. daily and add aspirin  Smoking cessation      Other orders  -     rosuvastatin (CRESTOR) 10 MG tablet; Take 1 tablet by mouth Daily.  Dispense: 90 tablet; Refill: 3  -     aspirin 81 MG EC tablet; Take 1 tablet by mouth Daily.  Dispense: 90 tablet; Refill: 3         Return in about 3 months (around 6/14/2024) for Follow-up with Dr Holder.    Patient Instructions   [facility]  [facility] CARDIOLOGY  [address] [place] [address]  Dept: [phone]  Dept Fax: [phone]    Patient: Cecil Fermin  YOB: 1970    Time and date of the stress test : ___________________________________  Your test will be at:  [facility] cardiology  [address] [place] [address] [place], [place] [id number] [phone]    Patient instructions:    Please refrain from smoking 2 - 4 hours prior to your test.    If your appointment is in the morning, do not eat breakfast.  Drink plenty of water or juice do not take diabetic medications    If your appointment is after 1230, eat a light breakfast before 9 AM.  Continue drinking water or juice and a light breakfast, but absolutely no coffee tea or soft drinks.    Drink plenty of juice or water the day of your test.  Absolutely NO coffee, tea, soft drinks, or chocolate    If you are on a beta-blocker, please hold the day before and the day of your test.  A list of some  beta-blockers includes atenolol, Bystolic, bisoprolol, carvedilol, Coreg, Lopressor, metoprolol, Toprol-XL, sotalol.  Also hold some calcium channel blockers that can slow down the heart, such as Cardizem, diltiazem, verapamil.    Do not take Viagra, Cialis, or Levitra 48-hour before your test    If you test includes the treadmill, please wear comfortable clothing and exercise shoes    It is not necessary to bring anyone with you.  However if you do bring someone they are unable to go back with you during testing.  They are welcome to wait in the lobby.    Please bring a list of your current medications    Please bring your insurance cards and the picture ID with you.    Please arrive 15 minutes before your appointment.    This test will last between 2 and 2-1/2 hours     If you must cancel, please give me 24-hour notice before your appointment.    MD Jamel Valentine MD

## 2024-03-15 NOTE — PROGRESS NOTES
Pulmonary function test shows moderately severe COPD.  I recommend patient schedule a follow-up visit so we can discuss appropriate management and treatment.

## 2024-06-05 ENCOUNTER — TELEPHONE (OUTPATIENT)
Dept: FAMILY MEDICINE CLINIC | Facility: CLINIC | Age: 54
End: 2024-06-05
Payer: COMMERCIAL

## 2024-06-14 ENCOUNTER — OFFICE VISIT (OUTPATIENT)
Dept: CARDIOLOGY | Facility: CLINIC | Age: 54
End: 2024-06-14
Payer: COMMERCIAL

## 2024-06-14 VITALS
SYSTOLIC BLOOD PRESSURE: 162 MMHG | WEIGHT: 182 LBS | DIASTOLIC BLOOD PRESSURE: 110 MMHG | HEART RATE: 48 BPM | OXYGEN SATURATION: 97 % | HEIGHT: 68 IN | RESPIRATION RATE: 20 BRPM | BODY MASS INDEX: 27.58 KG/M2

## 2024-06-14 DIAGNOSIS — E78.00 PURE HYPERCHOLESTEROLEMIA: ICD-10-CM

## 2024-06-14 DIAGNOSIS — R06.02 SHORTNESS OF BREATH: ICD-10-CM

## 2024-06-14 DIAGNOSIS — R07.9 CHRONIC CHEST PAIN WITH HIGH RISK FOR CAD: Primary | ICD-10-CM

## 2024-06-14 DIAGNOSIS — G89.29 CHRONIC CHEST PAIN WITH HIGH RISK FOR CAD: Primary | ICD-10-CM

## 2024-06-14 DIAGNOSIS — I10 HYPERTENSION, ESSENTIAL: ICD-10-CM

## 2024-06-14 DIAGNOSIS — Z91.89 CHRONIC CHEST PAIN WITH HIGH RISK FOR CAD: Primary | ICD-10-CM

## 2024-06-14 RX ORDER — NITROGLYCERIN 0.4 MG/1
TABLET SUBLINGUAL
Qty: 100 TABLET | Refills: 11 | Status: SHIPPED | OUTPATIENT
Start: 2024-06-14

## 2024-06-14 RX ORDER — AZITHROMYCIN 250 MG/1
TABLET, FILM COATED ORAL
Qty: 6 TABLET | Refills: 0 | Status: SHIPPED | OUTPATIENT
Start: 2024-06-14

## 2024-06-14 RX ORDER — RAMIPRIL 10 MG/1
10 CAPSULE ORAL DAILY
Qty: 90 CAPSULE | Refills: 3 | Status: SHIPPED | OUTPATIENT
Start: 2024-06-14

## 2024-06-14 RX ORDER — NITROGLYCERIN 0.4 MG/1
0.4 TABLET SUBLINGUAL
Status: SHIPPED | OUTPATIENT
Start: 2024-06-14

## 2024-06-14 RX ORDER — PREDNISONE 10 MG/1
TABLET ORAL
Qty: 32 TABLET | Refills: 1 | Status: SHIPPED | OUTPATIENT
Start: 2024-06-14 | End: 2024-06-29

## 2024-06-14 RX ORDER — ROSUVASTATIN CALCIUM 20 MG/1
20 TABLET, COATED ORAL DAILY
Qty: 90 TABLET | Refills: 3 | Status: SHIPPED | OUTPATIENT
Start: 2024-06-14

## 2024-06-14 NOTE — ASSESSMENT & PLAN NOTE
Patient appears to be in COPD exacerbation today.  PFTs showing moderate obstruction and moderate reduction in DLCO.  Echo consistent with hypertensive heart disease without heart failure, normal ejection fraction mild LVH grade 1 diastolic dysfunction.  Missed stress testing with nuclear imaging. Plan:  Treatment of COPD exacerbation with antibiotic and steroids today  Optimize blood pressure control by uptitrating ramipril and emphasizing low-salt diet  Follow-up blood pressure control and shortness of breath in 2 weeks  Labs today for assessment as well as chest x-ray

## 2024-06-14 NOTE — ASSESSMENT & PLAN NOTE
Patient with significant risk factors (age, male gender, hypertension, dyslipidemia, heavy smoking, poor diet), had recurrent chest pains.  Blood pressure poorly controlled with no medication related side effects.  Echo with normal EF mild LVH and grade 1 suggestive of hypertensive heart disease.  NICOLAS normal.  Treadmill stress test indeterminate, mixed two appointments for nuclear stress testing.  In COPD exacerbation today with severe elevation of blood pressure and very mild chest tightness, felt related to lung disease.  EKG in office normal unchanged.  Plan:  Nitroglycerin sublingual 400 mcg provided in the office with complete relief of chest pressure  Uptitrate rosuvastatin to 20 mg p.o. daily since well-tolerated  Uptitrate ramipril to 10 mg p.o. daily  Send labs - proBNP, CBC, CMP, magnesium  Smoking cessation revisited extensively   Continue aspirin 81 mg daily in view of borderline abnormal stress test, awaiting Lexiscan nuclear stress test repeat  Refer patient for a Lexiscan nuclear stress test  Patient counseled in regards to heart healthy, low fat/ low cholesterol/low sat diet, daily exercise for 30 minutes, low to moderate intensity, and weight loss.  Acute treatment for COPD exacerbation provided (antibiotic, steroids), and follow-up with pulmonary regarding lung disease and PFTs results  If recurrence of chest pain or worsening shortness of breath, patient to go to the emergency room

## 2024-06-14 NOTE — ASSESSMENT & PLAN NOTE
Lipid abnormalities are improving with treatment    Plan:  Plan dosage change to the following medication/s;  uptitrate rosuvastatin to 20 mg p.o. daily since LDL not to goal.      Discussed medication dosage, use, side effects, and goals of treatment in detail.    Counseled patient on lifestyle modifications to help control hyperlipidemia.   Cholesterol lowering dietary information shared with patient.  Advised patient to exercise for 150 minutes weekly. (30 minute brisk walk, 5 days a week for example)  Weight Loss encouraged  Stop tobacco use encouraged  Patient counseled in regards to heart healthy, low fat/ low cholesterol/low sat diet, daily exercise for 30 minutes, low to moderate intensity, and weight loss.    Lab Results   Component Value Date    CHLPL 232 (H) 10/25/2023    TRIG 249 (H) 10/25/2023    HDL 35 (L) 10/25/2023     (H) 10/25/2023     Goal of therapy: LDL <70 mg/dL    Followup in 6 months.

## 2024-06-14 NOTE — PROGRESS NOTES
[facility] [place]  [facility]  [address] [place] [address]  Dept: [phone]  Dept Fax: [phone]    Date: 06/14/2024  Patient: Cecil Fermin  YOB: 1970    Follow Up Office Visit Note    Interval Follow-up  Mr. Cecil Fermin is a 54 y.o. male who is here for follow-up on Chest Pain and Shortness of Breath.    Subjective   Patient doing well today, with significant shortness of breath/wheezing and chest tightness mild left-sided, sharp, type unable to get deep breath.  Patient with a dry cough, increased use of inhalers.  Patient is smoking 3 packs a day on daily basis.  Patient denies orthopnea, PND, palpitations, lightheadedness, syncope or medications side-effects.  Patient admits compliance with medications.  The following portions of the patient's history were reviewed and updated as appropriate: allergies, current medications, past family history, past medical history, past social history, past surgical history, and problem list.    Medications: No Known Allergies   Current Outpatient Medications   Medication Instructions    amLODIPine (NORVASC) 5 mg, Oral, Daily    aspirin 81 mg, Oral, Daily    azithromycin (Zithromax Z-Jair) 250 MG tablet Take 2 tablets by mouth on day 1, then 1 tablet daily on days 2-5    nitroglycerin (NITROSTAT) 0.4 MG SL tablet 1 under the tongue as needed for angina, may repeat every 5 mins for up three doses.  If no resolution of chest pain, call 911 or head to the nearest emergency room.    predniSONE (DELTASONE) 10 MG tablet Take 4 tablets by mouth Daily for 3 days, THEN 3 tablets Daily for 3 days, THEN 2 tablets Daily for 3 days, THEN 1 tablet Daily for 3 days, THEN 0.5 tablets Daily for 3 days.    propranolol LA (INDERAL LA) 60 mg, Oral, Daily    ramipril (ALTACE) 10 mg, Oral, Daily    rosuvastatin (CRESTOR) 20 mg, Oral, Daily    tiotropium bromide-olodaterol (Stiolto Respimat) 2.5-2.5 MCG/ACT aerosol  "solution inhaler 2 puffs, Inhalation, Daily - RT, 3 inhalers, 3 month supply       Tobacco Use: High Risk (6/14/2024)    Patient History     Smoking Tobacco Use: Every Day     Smokeless Tobacco Use: Never     Passive Exposure: Not on file        Objective  Vitals:    06/14/24 1337   BP: (!) 162/110   Pulse: (!) 48   Resp: 20   SpO2: 97%   Weight: 82.6 kg (182 lb)   Height: 172.7 cm (68\")      Vitals:    06/14/24 1337   BP: (!) 162/110   Pulse: (!) 48   Resp: 20   SpO2: 97%   Weight: 82.6 kg (182 lb)   Height: 172.7 cm (68\")          Physical Exam  Constitutional:       Appearance: Healthy appearance. Not in distress.   Eyes:      Pupils: Pupils are equal, round, and reactive to light.   Neck:      Vascular: No JVR. JVD normal.   Pulmonary:      Effort: Pulmonary effort is normal.      Breath sounds: Decreased air movement present. Decreased breath sounds present. Wheezing present. Rhonchi present. No rales.   Chest:      Chest wall: Not tender to palpatation.   Cardiovascular:      PMI at left midclavicular line. Normal rate. Regular rhythm. Normal S1 with normal intensity. Normal S2 with normal intensity.       Murmurs: There is no murmur.      No gallop.  No click. No rub.   Pulses:     Intact distal pulses.   Edema:     Peripheral edema absent.   Abdominal:      General: There is no abdominal bruit.   Skin:     General: Skin is warm.   Neurological:      Mental Status: Alert and oriented to person, place and time.              Diagnostic Data  Lab Results   Component Value Date     10/25/2023    K 4.2 10/25/2023     10/25/2023    CO2 22 10/25/2023    BUN 14 10/25/2023    CREATININE 1.05 10/25/2023    CALCIUM 9.7 10/25/2023    BILITOT 0.8 10/25/2023    ALKPHOS 62 10/25/2023    ALT 16 10/25/2023    AST 26 10/25/2023    GLUCOSE 122 (H) 10/25/2023    ALBUMIN 4.3 10/25/2023     Lab Results   Component Value Date    WBC 10.2 10/25/2023    HGB 15.4 10/25/2023    HCT 45.8 10/25/2023     10/25/2023 " "    No results found for: \"APTT\", \"INR\", \"PTT\"  Lab Results   Component Value Date    CKTOTAL 137 10/25/2023    CKTOTAL 100 01/20/2023     No results found for: \"BNP\", \"PROBNP\"    Lab Results   Component Value Date    CHLPL 232 (H) 10/25/2023    TRIG 249 (H) 10/25/2023    HDL 35 (L) 10/25/2023     (H) 10/25/2023     Lab Results   Component Value Date    TSH 1.380 10/25/2023    FREET4 1.22 10/25/2023       [facility]:    ECG 12 Lead    Date/Time: 6/14/2024 1:44 PM  Performed by: Jamel Holder MD    Authorized by: Jamel Holder MD  Comparison: compared with previous ECG from 2/21/2024  Comparison to previous ECG: New onset sinus bradycardia  Rhythm: sinus bradycardia  Other findings: non-specific ST-T wave changes          CXR: No results found for this or any previous visit.     ECHO/MUGA: Results for orders placed in visit on 11/02/23    Adult Transthoracic Echo Complete W/ Cont if Necessary Per Protocol    Interpretation Summary    Normal EF normal LV size and function, ejection fraction estimated by 2D, 56 - 60%.    Left ventricular wall thickness is consistent with mild concentric hypertrophy.    The left atrial cavity is mildly dilated.    The right atrial cavity is borderline dilated.    Trace aortic valve regurgitation is present    Left ventricular diastolic function is consistent with (grade I) impaired relaxation.    Mild dilation of the aortic root is present. The aortic root measures 4.2 cm.     STRESS TESTS: Results for orders placed in visit on 03/01/24    Treadmill Stress Test    Interpretation Summary    Findings consistent with an indeterminate ECG stress test.    Patient did not reach target heart rate (63% of maximum heart rate, goal 85%), limited by severe shortness of breath.    There was minor EKG changes during stress that did not reach significance for ischemia, limited by baseline ST-T changes.    Consider workup of shortness of breath including full PFTs or chest CT if " clinically indicated    Consider a different modality to rule out CAD including Lexiscan nuclear stress test, dobutamine stress echo or coronary CTA.     CARDIAC CATH: No results found for this or any previous visit.     DEVICES: No valid procedures specified.   HOLTER: No results found for this or any previous visit.     CT/MRI:  No results found for this or any previous visit.    VASCULAR: No valid procedures specified.     Assessment and Plan  Diagnoses and all orders for this visit:    1. Chronic chest pain with high risk for CAD (Primary)  Assessment & Plan:  Patient with significant risk factors (age, male gender, hypertension, dyslipidemia, heavy smoking, poor diet), had recurrent chest pains.  Blood pressure poorly controlled with no medication related side effects.  Echo with normal EF mild LVH and grade 1 suggestive of hypertensive heart disease.  NICOLAS normal.  Treadmill stress test indeterminate, mixed two appointments for nuclear stress testing.  In COPD exacerbation today with severe elevation of blood pressure and very mild chest tightness, felt related to lung disease.  EKG in office normal unchanged.  Plan:  Nitroglycerin sublingual 400 mcg provided in the office with complete relief of chest pressure  Uptitrate rosuvastatin to 20 mg p.o. daily since well-tolerated  Uptitrate ramipril to 10 mg p.o. daily  Send labs - proBNP, CBC, CMP, magnesium  Smoking cessation revisited extensively   Continue aspirin 81 mg daily in view of borderline abnormal stress test, awaiting Lexiscan nuclear stress test repeat  Refer patient for a Lexiscan nuclear stress test  Patient counseled in regards to heart healthy, low fat/ low cholesterol/low sat diet, daily exercise for 30 minutes, low to moderate intensity, and weight loss.  Acute treatment for COPD exacerbation provided (antibiotic, steroids), and follow-up with pulmonary regarding lung disease and PFTs results  If recurrence of chest pain or worsening shortness  of breath, patient to go to the emergency room    Orders:  -     ECG 12 Lead  -     nitroglycerin (NITROSTAT) SL tablet 0.4 mg    2. Shortness of breath  Assessment & Plan:  Patient appears to be in COPD exacerbation today.  PFTs showing moderate obstruction and moderate reduction in DLCO.  Echo consistent with hypertensive heart disease without heart failure, normal ejection fraction mild LVH grade 1 diastolic dysfunction.  Missed stress testing with nuclear imaging. Plan:  Treatment of COPD exacerbation with antibiotic and steroids today  Optimize blood pressure control by uptitrating ramipril and emphasizing low-salt diet  Follow-up blood pressure control and shortness of breath in 2 weeks  Labs today for assessment as well as chest x-ray    Orders:  -     Magnesium  -     proBNP  -     CBC & Differential  -     Comprehensive Metabolic Panel  -     XR Chest PA & Lateral; Future    3. Pure hypercholesterolemia  Assessment & Plan:   Lipid abnormalities are improving with treatment    Plan:  Plan dosage change to the following medication/s;  uptitrate rosuvastatin to 20 mg p.o. daily since LDL not to goal.      Discussed medication dosage, use, side effects, and goals of treatment in detail.    Counseled patient on lifestyle modifications to help control hyperlipidemia.   Cholesterol lowering dietary information shared with patient.  Advised patient to exercise for 150 minutes weekly. (30 minute brisk walk, 5 days a week for example)  Weight Loss encouraged  Stop tobacco use encouraged  Patient counseled in regards to heart healthy, low fat/ low cholesterol/low sat diet, daily exercise for 30 minutes, low to moderate intensity, and weight loss.    Lab Results   Component Value Date    CHLPL 232 (H) 10/25/2023    TRIG 249 (H) 10/25/2023    HDL 35 (L) 10/25/2023     (H) 10/25/2023     Goal of therapy: LDL <70 mg/dL    Followup in 6 months.      4. Hypertension, essential  Assessment & Plan:  Hypertension is  uncontrolled  Medication changes per orders.  Dietary sodium restriction.  Weight loss.  Regular aerobic exercise.  Stop smoking.  Ambulatory blood pressure monitoring.  Patient counseled in regards to heart healthy, low fat/ low cholesterol/low sat diet, daily exercise for 30 minutes, low to moderate intensity, and weight loss.  Uptitrate ramipril to 10 mg p.o. daily.  Blood pressure will be reassessed in 2 weeks.      Other orders  -     azithromycin (Zithromax Z-Jair) 250 MG tablet; Take 2 tablets by mouth on day 1, then 1 tablet daily on days 2-5  Dispense: 6 tablet; Refill: 0  -     predniSONE (DELTASONE) 10 MG tablet; Take 4 tablets by mouth Daily for 3 days, THEN 3 tablets Daily for 3 days, THEN 2 tablets Daily for 3 days, THEN 1 tablet Daily for 3 days, THEN 0.5 tablets Daily for 3 days.  Dispense: 32 tablet; Refill: 1  -     ramipril (Altace) 10 MG capsule; Take 1 capsule by mouth Daily.  Dispense: 90 capsule; Refill: 3  -     rosuvastatin (CRESTOR) 20 MG tablet; Take 1 tablet by mouth Daily.  Dispense: 90 tablet; Refill: 3  -     nitroglycerin (NITROSTAT) 0.4 MG SL tablet; 1 under the tongue as needed for angina, may repeat every 5 mins for up three doses.  If no resolution of chest pain, call 911 or head to the nearest emergency room.  Dispense: 100 tablet; Refill: 11         No follow-ups on file.    There are no Patient Instructions on file for this visit.    Jamel Holder MD

## 2024-06-14 NOTE — ASSESSMENT & PLAN NOTE
Hypertension is uncontrolled  Medication changes per orders.  Dietary sodium restriction.  Weight loss.  Regular aerobic exercise.  Stop smoking.  Ambulatory blood pressure monitoring.  Patient counseled in regards to heart healthy, low fat/ low cholesterol/low sat diet, daily exercise for 30 minutes, low to moderate intensity, and weight loss.  Uptitrate ramipril to 10 mg p.o. daily.  Blood pressure will be reassessed in 2 weeks.

## 2024-06-14 NOTE — PROGRESS NOTES
1157 chest pain on left side, denies any radiating pain,no nausea at this time but states does have it sometimes,not diaphoretic.  Nitro 0.4mg SL administered per Dr. Holder.    1401  /98 and patient states he does not feel any discomfort at this time.     If you are a smoker, it is important for your health to stop smoking. Please be aware that second hand smoke is also harmful.

## 2024-06-15 LAB
ALBUMIN SERPL-MCNC: 4.7 G/DL (ref 3.8–4.9)
ALP SERPL-CCNC: 72 IU/L (ref 44–121)
ALT SERPL-CCNC: 33 IU/L (ref 0–44)
AST SERPL-CCNC: 35 IU/L (ref 0–40)
BASOPHILS # BLD AUTO: 0.1 X10E3/UL (ref 0–0.2)
BASOPHILS NFR BLD AUTO: 1 %
BILIRUB SERPL-MCNC: 0.8 MG/DL (ref 0–1.2)
BUN SERPL-MCNC: 11 MG/DL (ref 6–24)
BUN/CREAT SERPL: 12 (ref 9–20)
CALCIUM SERPL-MCNC: 9.9 MG/DL (ref 8.7–10.2)
CHLORIDE SERPL-SCNC: 103 MMOL/L (ref 96–106)
CO2 SERPL-SCNC: 22 MMOL/L (ref 20–29)
CREAT SERPL-MCNC: 0.94 MG/DL (ref 0.76–1.27)
EGFRCR SERPLBLD CKD-EPI 2021: 95 ML/MIN/1.73
EOSINOPHIL # BLD AUTO: 0.4 X10E3/UL (ref 0–0.4)
EOSINOPHIL NFR BLD AUTO: 3 %
ERYTHROCYTE [DISTWIDTH] IN BLOOD BY AUTOMATED COUNT: 13.5 % (ref 11.6–15.4)
GLOBULIN SER CALC-MCNC: 2.8 G/DL (ref 1.5–4.5)
GLUCOSE SERPL-MCNC: 91 MG/DL (ref 70–99)
HCT VFR BLD AUTO: 45.1 % (ref 37.5–51)
HGB BLD-MCNC: 15.4 G/DL (ref 13–17.7)
IMM GRANULOCYTES # BLD AUTO: 0 X10E3/UL (ref 0–0.1)
IMM GRANULOCYTES NFR BLD AUTO: 0 %
LYMPHOCYTES # BLD AUTO: 3.2 X10E3/UL (ref 0.7–3.1)
LYMPHOCYTES NFR BLD AUTO: 26 %
MAGNESIUM SERPL-MCNC: 2.1 MG/DL (ref 1.6–2.3)
MCH RBC QN AUTO: 33 PG (ref 26.6–33)
MCHC RBC AUTO-ENTMCNC: 34.1 G/DL (ref 31.5–35.7)
MCV RBC AUTO: 97 FL (ref 79–97)
MONOCYTES # BLD AUTO: 0.9 X10E3/UL (ref 0.1–0.9)
MONOCYTES NFR BLD AUTO: 7 %
NEUTROPHILS # BLD AUTO: 8 X10E3/UL (ref 1.4–7)
NEUTROPHILS NFR BLD AUTO: 63 %
NT-PROBNP SERPL-MCNC: 107 PG/ML (ref 0–210)
PLATELET # BLD AUTO: 284 X10E3/UL (ref 150–450)
POTASSIUM SERPL-SCNC: 4.1 MMOL/L (ref 3.5–5.2)
PROT SERPL-MCNC: 7.5 G/DL (ref 6–8.5)
RBC # BLD AUTO: 4.67 X10E6/UL (ref 4.14–5.8)
SODIUM SERPL-SCNC: 140 MMOL/L (ref 134–144)
WBC # BLD AUTO: 12.6 X10E3/UL (ref 3.4–10.8)

## 2024-06-18 ENCOUNTER — TELEPHONE (OUTPATIENT)
Dept: CARDIOLOGY | Facility: CLINIC | Age: 54
End: 2024-06-18
Payer: COMMERCIAL

## 2024-06-18 NOTE — TELEPHONE ENCOUNTER
Name: Cecil Fermin      Relationship: Self      Best Callback Number: [phone]      HUB PROVIDED THE RELAY MESSAGE FROM THE OFFICE      PATIENT: VOICED UNDERSTANDING AND HAS NO FURTHER QUESTIONS AT THIS TIME    ADDITIONAL INFORMATION: PATIENT REPORTS HE IS DOING OKAY, STILL HAVING DIFFICULTY BREATHING BUT HE STARTED THE NEW MEDICATION YESTERDAY.

## 2024-06-18 NOTE — TELEPHONE ENCOUNTER
Tried calling patient to discuss lab results. No answer, no VM. Will try again later.   HUB MAY RELAY

## 2024-06-18 NOTE — TELEPHONE ENCOUNTER
----- Message from Jamel Holder sent at 6/17/2024  9:23 PM EDT -----  Please inform patient that his Blood work showed elevated blood counts, suggesting an infection, for which he already received Antibiotics. Please also check on him how he's doing. Thank you!

## 2024-06-20 ENCOUNTER — TELEPHONE (OUTPATIENT)
Dept: CARDIOLOGY | Facility: CLINIC | Age: 54
End: 2024-06-20
Payer: COMMERCIAL

## 2024-06-20 NOTE — TELEPHONE ENCOUNTER
----- Message from Jamel Holder sent at 6/20/2024  3:04 PM EDT -----  Please inform patient that his chest x-ray showed no abnormalities. Thank you!

## 2024-06-20 NOTE — TELEPHONE ENCOUNTER
Spoke with  Jeny and advised that his chest xray showed no abnormalities.  He verbalized understanding.

## 2024-07-25 ENCOUNTER — TELEPHONE (OUTPATIENT)
Dept: CARDIOLOGY | Facility: CLINIC | Age: 54
End: 2024-07-25
Payer: COMMERCIAL

## 2024-07-25 NOTE — TELEPHONE ENCOUNTER
----- Message from Jamel Holder sent at 7/24/2024  4:29 PM EDT -----  Please inform patient that his lexiscan nuclear stress test was low risk, probably normal.  He had significant attenuation in the inferior wall, and an old infarction in those segments cannot be ruled out based on this study.  Thank you!

## 2024-07-25 NOTE — TELEPHONE ENCOUNTER
Called patient to inform of lexiscan result, Patient's phone unavailable. Not reachable at this time, per automated message. Will send result letter today

## 2024-08-05 ENCOUNTER — TELEPHONE (OUTPATIENT)
Dept: CARDIOLOGY | Facility: CLINIC | Age: 54
End: 2024-08-05
Payer: COMMERCIAL

## 2024-08-05 NOTE — TELEPHONE ENCOUNTER
PTS WIFE CALLING BACK IN AND WANTED TO LET DR. CLINTON KNOW THAT PATIENT SIGNED OUT OF THE HOSPITAL AMA AND HAD TESTING DONE, WOULD LIKE FOR DR. CLINTON TO GET THOSE AS HE LEFT BEFORE RESULTS WERE READ. WOULD LIKE TO KNOW IN CASE HE HAD A HEART ATTACK TODAY. PLEASE ADVISE, THANK YOU.

## 2024-08-05 NOTE — TELEPHONE ENCOUNTER
Caller: SD YIN    Relationship: Emergency Contact    Best call back number: [phone]    What was the call regarding: PTS WIFE CALLING IN TO LET DR. CLINTON KNOW THAT PT WENT TO HOSPITAL WITH CHEST PAIN AFTER TAKING 3 NITROS. PLEASE ADVISE, THANK YOU.

## 2024-08-07 ENCOUNTER — OFFICE VISIT (OUTPATIENT)
Dept: CARDIOLOGY | Facility: CLINIC | Age: 54
End: 2024-08-07
Payer: COMMERCIAL

## 2024-08-07 VITALS
WEIGHT: 190.6 LBS | BODY MASS INDEX: 28.89 KG/M2 | DIASTOLIC BLOOD PRESSURE: 84 MMHG | OXYGEN SATURATION: 97 % | HEART RATE: 81 BPM | HEIGHT: 68 IN | SYSTOLIC BLOOD PRESSURE: 142 MMHG | RESPIRATION RATE: 16 BRPM

## 2024-08-07 DIAGNOSIS — I10 HYPERTENSION, ESSENTIAL: ICD-10-CM

## 2024-08-07 DIAGNOSIS — E78.00 PURE HYPERCHOLESTEROLEMIA: ICD-10-CM

## 2024-08-07 DIAGNOSIS — J42 CHRONIC BRONCHITIS, UNSPECIFIED CHRONIC BRONCHITIS TYPE: Primary | ICD-10-CM

## 2024-08-07 DIAGNOSIS — G89.29 CHRONIC CHEST PAIN WITH HIGH RISK FOR CAD: ICD-10-CM

## 2024-08-07 DIAGNOSIS — R06.02 SHORTNESS OF BREATH: ICD-10-CM

## 2024-08-07 DIAGNOSIS — Z91.89 CHRONIC CHEST PAIN WITH HIGH RISK FOR CAD: ICD-10-CM

## 2024-08-07 DIAGNOSIS — R07.9 CHRONIC CHEST PAIN WITH HIGH RISK FOR CAD: ICD-10-CM

## 2024-08-07 RX ORDER — METOPROLOL TARTRATE 100 MG/1
100 TABLET ORAL ONCE
OUTPATIENT
Start: 2024-08-07

## 2024-08-07 RX ORDER — SODIUM CHLORIDE 0.9 % (FLUSH) 0.9 %
10 SYRINGE (ML) INJECTION EVERY 12 HOURS SCHEDULED
OUTPATIENT
Start: 2024-08-07

## 2024-08-07 RX ORDER — METOPROLOL TARTRATE 50 MG/1
50 TABLET, FILM COATED ORAL
OUTPATIENT
Start: 2024-08-07

## 2024-08-07 RX ORDER — IPRATROPIUM BROMIDE AND ALBUTEROL SULFATE 2.5; .5 MG/3ML; MG/3ML
3 SOLUTION RESPIRATORY (INHALATION)
Status: SHIPPED | OUTPATIENT
Start: 2024-08-07

## 2024-08-07 RX ORDER — METOPROLOL TARTRATE 100 MG/1
200 TABLET ORAL ONCE
OUTPATIENT
Start: 2024-08-07 | End: 2024-08-07

## 2024-08-07 RX ORDER — SODIUM CHLORIDE 9 MG/ML
40 INJECTION, SOLUTION INTRAVENOUS AS NEEDED
OUTPATIENT
Start: 2024-08-07

## 2024-08-07 RX ORDER — METOPROLOL TARTRATE 1 MG/ML
5 INJECTION, SOLUTION INTRAVENOUS
OUTPATIENT
Start: 2024-08-07

## 2024-08-07 RX ORDER — AMLODIPINE BESYLATE 10 MG/1
10 TABLET ORAL DAILY
Qty: 90 TABLET | Refills: 3 | Status: SHIPPED | OUTPATIENT
Start: 2024-08-07

## 2024-08-07 RX ORDER — METOPROLOL TARTRATE 50 MG/1
50 TABLET, FILM COATED ORAL ONCE
OUTPATIENT
Start: 2024-08-07

## 2024-08-07 RX ORDER — SODIUM CHLORIDE 0.9 % (FLUSH) 0.9 %
10 SYRINGE (ML) INJECTION AS NEEDED
OUTPATIENT
Start: 2024-08-07

## 2024-08-07 RX ORDER — OMEPRAZOLE 20 MG/1
20 CAPSULE, DELAYED RELEASE ORAL DAILY
Qty: 90 CAPSULE | Refills: 3 | Status: SHIPPED | OUTPATIENT
Start: 2024-08-07

## 2024-08-07 RX ORDER — NITROGLYCERIN 0.4 MG/1
0.4 TABLET SUBLINGUAL
OUTPATIENT
Start: 2024-08-07 | End: 2024-08-07

## 2024-08-07 RX ORDER — NITROGLYCERIN 0.4 MG/1
0.8 TABLET SUBLINGUAL
OUTPATIENT
Start: 2024-08-07

## 2024-08-07 RX ORDER — IPRATROPIUM BROMIDE AND ALBUTEROL SULFATE 2.5; .5 MG/3ML; MG/3ML
3 SOLUTION RESPIRATORY (INHALATION) EVERY 4 HOURS PRN
Qty: 360 ML | Refills: 3 | Status: SHIPPED | OUTPATIENT
Start: 2024-08-07

## 2024-08-07 RX ORDER — METOPROLOL TARTRATE 50 MG/1
TABLET, FILM COATED ORAL
Qty: 2 TABLET | Refills: 0 | Status: SHIPPED | OUTPATIENT
Start: 2024-08-07

## 2024-08-07 NOTE — ASSESSMENT & PLAN NOTE
Patient with significant risk factors (age, male gender, hypertension, dyslipidemia, heavy smoking, poor diet), had recurrent chest pains.  Blood pressure poorly controlled with no medication related side effects.  Echo with normal EF mild LVH and grade 1 suggestive of hypertensive heart disease.  NICOLAS normal.  Treadmill nuclear stress test low risk probably normal.  EKG in office normal unchanged.  Recent ER visit for chest pain, felt related to severe GERD/GI.  Plan:  Nitroglycerin sublingual as needed  Continue aspirin 81 mg and rosuvastatin 20 mg p.o. daily/well-tolerated  Continue ramipril 10 mg p.o. daily, uptitrate amlodipine to 10 mg p.o. daily for better blood pressure control  Smoking cessation revisited extensively   Patient counseled in regards to heart healthy, low fat/ low cholesterol/low sat diet, daily exercise for 30 minutes, low to moderate intensity, and weight loss.  Prilosec represcribed for GI symptoms, in view of severity leading to an ER visit  Referred for coronary CTA for diagnosis of CAD/reassurance purposes in view of stress test results rule out false negative, in view of recurrent chest pains, with risk factors.

## 2024-08-07 NOTE — ASSESSMENT & PLAN NOTE
Breathing appears to be much improved today.  Last visit in COPD exacerbation.  PFTs showing moderate obstruction and moderate reduction in DLCO.  Echo consistent with hypertensive heart disease without heart failure, normal ejection fraction mild LVH grade 1 diastolic dysfunction.  Stress test with nuclear imaging low risk probably normal. Plan:  Reinforced smoking cessation  Optimize blood pressure control by uptitrating amlodipine to 10 mg daily and emphasizing low-salt diet  Follow-up blood pressure control and shortness of breath

## 2024-08-07 NOTE — ASSESSMENT & PLAN NOTE
Lipid abnormalities are improving with treatment    Plan:  Continue same medication/s without change.      Discussed medication dosage, use, side effects, and goals of treatment in detail.    Counseled patient on lifestyle modifications to help control hyperlipidemia.   Cholesterol lowering dietary information shared with patient.  Advised patient to exercise for 150 minutes weekly. (30 minute brisk walk, 5 days a week for example)  Weight Loss encouraged  Stop tobacco use encouraged    Patient Treatment Goals:   LDL goal is less than 70  Awaiting repeat lipid profile as per primary care provider prior to changing Crestor dosing, versus adding a new medication.    Followup in 6 months.

## 2024-08-07 NOTE — ASSESSMENT & PLAN NOTE
Hypertension is borderline, in office and at home readings.  Medication changes per orders.  Continue current treatment regimen.  Dietary sodium restriction.  Weight loss.  Regular aerobic exercise.  Stop smoking.  Ambulatory blood pressure monitoring.  Blood pressure will be reassessedin 6 months.

## 2024-08-07 NOTE — PROGRESS NOTES
[facility] [place]  [facility] CARDIOLOGY  [address] [place] [address]  Dept: [phone]  Dept Fax: [phone]    Date: 08/07/2024  Patient: Cecil Fermin  YOB: 1970    Follow Up Office Visit Note    Interval Follow-up  Mr. Cecil Fermin is a 54 y.o. male who is here for follow-up on Chest Pain.    Subjective   Presented to the hospital after 3 hours of chest pains at home, felt like bloating in his stomach.  In the ER workup negative.  Symptoms likely GI related.  Patient still smoking, less than prior nonetheless.  Patient denies orthopnea, PND, palpitations, lightheadedness, syncope or medications side-effects.  The following portions of the patient's history were reviewed and updated as appropriate: allergies, current medications, past family history, past medical history, past social history, past surgical history, and problem list.    Medications: No Known Allergies   Current Outpatient Medications   Medication Instructions    amLODIPine (NORVASC) 10 mg, Oral, Daily    aspirin 81 mg, Oral, Daily    azithromycin (Zithromax Z-Jair) 250 MG tablet Take 2 tablets by mouth on day 1, then 1 tablet daily on days 2-5    ipratropium-albuterol (DUO-NEB) 0.5-2.5 mg/3 ml nebulizer 3 mL, Nebulization, Every 4 Hours PRN    metoprolol tartrate (LOPRESSOR) 50 MG tablet Take 50 mg at Bedtime the Night Before Coronary CTA Appointment and In the Morning 1 Hour Prior to Coronary CTA Appointment    nitroglycerin (NITROSTAT) 0.4 MG SL tablet 1 under the tongue as needed for angina, may repeat every 5 mins for up three doses.  If no resolution of chest pain, call 911 or head to the nearest emergency room.    omeprazole (PRILOSEC) 20 mg, Oral, Daily    propranolol LA (INDERAL LA) 60 mg, Oral, Daily    ramipril (ALTACE) 10 mg, Oral, Daily    rosuvastatin (CRESTOR) 20 mg, Oral, Daily    tiotropium bromide-olodaterol (Stiolto Respimat) 2.5-2.5 MCG/ACT aerosol solution inhaler  "2 puffs, Inhalation, Daily - RT, 3 inhalers, 3 month supply       Tobacco Use: High Risk (8/7/2024)    Patient History     Smoking Tobacco Use: Every Day     Smokeless Tobacco Use: Never     Passive Exposure: Not on file        Objective  Vitals:    08/07/24 1030   BP: 142/84   BP Location: Right arm   Patient Position: Sitting   Pulse: 81   Resp: 16   SpO2: 97%   Weight: 86.5 kg (190 lb 9.6 oz)   Height: 172.7 cm (67.99\")   PainSc: 0-No pain      Vitals:    08/07/24 1030   BP: 142/84   BP Location: Right arm   Patient Position: Sitting   Pulse: 81   Resp: 16   SpO2: 97%   Weight: 86.5 kg (190 lb 9.6 oz)   Height: 172.7 cm (67.99\")          Physical Exam  Constitutional:       Appearance: Healthy appearance. Not in distress.   Eyes:      Pupils: Pupils are equal, round, and reactive to light.   Neck:      Vascular: No JVR. JVD normal.   Pulmonary:      Effort: Pulmonary effort is normal.      Breath sounds: Normal breath sounds. No wheezing. No rhonchi. No rales.   Chest:      Chest wall: Not tender to palpatation.   Cardiovascular:      PMI at left midclavicular line. Normal rate. Regular rhythm. Normal S1 with normal intensity. Normal S2 with normal intensity.       Murmurs: There is no murmur.      No gallop.  No click. No rub.   Pulses:     Intact distal pulses.   Edema:     Peripheral edema absent.   Abdominal:      General: There is no abdominal bruit.   Skin:     General: Skin is warm.   Neurological:      Mental Status: Alert and oriented to person, place and time.              Diagnostic Data  Lab Results   Component Value Date     06/14/2024    K 4.1 06/14/2024     06/14/2024    CO2 22 06/14/2024    MG 2.1 06/14/2024    BUN 11 06/14/2024    CREATININE 0.94 06/14/2024    CALCIUM 9.9 06/14/2024    BILITOT 0.8 06/14/2024    ALKPHOS 72 06/14/2024    ALT 33 06/14/2024    AST 35 06/14/2024    GLUCOSE 91 06/14/2024    ALBUMIN 4.7 06/14/2024     Lab Results   Component Value Date    WBC 12.6 (H) " "06/14/2024    HGB 15.4 06/14/2024    HCT 45.1 06/14/2024     06/14/2024     No results found for: \"APTT\", \"INR\", \"PTT\"  Lab Results   Component Value Date    CKTOTAL 137 10/25/2023    CKTOTAL 100 01/20/2023     Lab Results   Component Value Date    PROBNP 107 06/14/2024       Lab Results   Component Value Date    CHLPL 232 (H) 10/25/2023    TRIG 249 (H) 10/25/2023    HDL 35 (L) 10/25/2023     (H) 10/25/2023     Lab Results   Component Value Date    TSH 1.380 10/25/2023    FREET4 1.22 10/25/2023       CV Diagnostics:  Procedures    CXR: Results for orders placed in visit on 06/14/24    XR Chest PA & Lateral    Narrative  XR CHEST PA AND LATERAL    Date of Exam: 6/14/2024 2:21 PM EDT    Indication: Shortnes of breath    Comparison: CT chest 12/14/2023    Findings:  Normal cardiomediastinal silhouette. The lungs are clear. No pleural effusion or pneumothorax. No acute osseous findings.    Impression  Impression:  No acute cardiopulmonary findings.        Electronically Signed: Tyler Costa MD  6/18/2024 9:57 AM EDT  Workstation ID: [id number]     ECHO/MUGA: Results for orders placed in visit on 11/02/23    Adult Transthoracic Echo Complete W/ Cont if Necessary Per Protocol    Interpretation Summary    Normal EF normal LV size and function, ejection fraction estimated by 2D, 56 - 60%.    Left ventricular wall thickness is consistent with mild concentric hypertrophy.    The left atrial cavity is mildly dilated.    The right atrial cavity is borderline dilated.    Trace aortic valve regurgitation is present    Left ventricular diastolic function is consistent with (grade I) impaired relaxation.    Mild dilation of the aortic root is present. The aortic root measures 4.2 cm.     STRESS TESTS: Results for orders placed in visit on 07/24/24    Stress Test With Myocardial Perfusion One Day    Interpretation Summary    Impressions are consistent with a low risk/probably normal Lexiscan nuclear stress study.    " Myocardial perfusion imaging displayed significant diaphragmatic attenuation in the inferior wall with small sized, moderate severity perfusion defect in the apical inferior and mid inferior segments of the left ventricle, during rest and stress, associated with a probably normal wall motion on cine imaging, limited by decreased counts.    Findings consistent with an indeterminate ECG stress test, with ischemic EKG changes at peak Lexiscan effect, finding limited by baseline nonspecific ST-T changes.    Left ventricular ejection fraction is normal (Calculated EF = 52%).    Diaphragmatic attenuation and GI artifacts are present.     CARDIAC CATH: No results found for this or any previous visit.     DEVICES: No valid procedures specified.   HOLTER: No results found for this or any previous visit.     CT/MRI:  No results found for this or any previous visit.    VASCULAR: No valid procedures specified.     Assessment and Plan  Diagnoses and all orders for this visit:    1. Chronic bronchitis, unspecified chronic bronchitis type (Primary)  Assessment & Plan:  Patient on Stiolto Respimat.  Prescribed nebulizers machine and solution for breakthrough symptoms, as patient reports severe symptoms when in exacerbation, and wants something to help with with that and prevent an emergency visit trip.    Orders:  -     Home Nebulizer  -     ipratropium-albuterol (DUO-NEB) nebulizer solution 3 mL    2. Chronic chest pain with high risk for CAD  Assessment & Plan:  Patient with significant risk factors (age, male gender, hypertension, dyslipidemia, heavy smoking, poor diet), had recurrent chest pains.  Blood pressure poorly controlled with no medication related side effects.  Echo with normal EF mild LVH and grade 1 suggestive of hypertensive heart disease.  NICOLAS normal.  Treadmill nuclear stress test low risk probably normal.  EKG in office normal unchanged.  Recent ER visit for chest pain, felt related to severe GERD/GI.   Plan:  Nitroglycerin sublingual as needed  Continue aspirin 81 mg and rosuvastatin 20 mg p.o. daily/well-tolerated  Continue ramipril 10 mg p.o. daily, uptitrate amlodipine to 10 mg p.o. daily for better blood pressure control  Smoking cessation revisited extensively   Patient counseled in regards to heart healthy, low fat/ low cholesterol/low sat diet, daily exercise for 30 minutes, low to moderate intensity, and weight loss.  Prilosec represcribed for GI symptoms, in view of severity leading to an ER visit  Referred for coronary CTA for diagnosis of CAD/reassurance purposes in view of stress test results rule out false negative, in view of recurrent chest pains, with risk factors.    Orders:  -     CT Angiogram Coronary; Future  -     CT Angiogram Coronary-Cardiology Interpretation; Future  -     metoprolol tartrate (LOPRESSOR) tablet 200 mg  -     metoprolol tartrate (LOPRESSOR) tablet 150 mg  -     metoprolol tartrate (LOPRESSOR) tablet 100 mg  -     metoprolol tartrate (LOPRESSOR) tablet 50 mg  -     metoprolol tartrate (LOPRESSOR) tablet 25 mg  -     metoprolol tartrate (LOPRESSOR) tablet 50 mg  -     metoprolol tartrate (LOPRESSOR) injection 5 mg  -     nitroglycerin (NITROSTAT) SL tablet 0.4 mg  -     nitroglycerin (NITROSTAT) SL tablet 0.8 mg  -     ivabradine HCl (CORLANOR) tablet 15 mg  -     No Caffeine or Nicotine 4 Hours Prior to CTA Appointment  -     Nothing to Eat or Drink 4 Hours Prior to CTA Appointment  -     Obtain Informed Consent - Computed Tomography Angiography of Chest - Angiogram of Coronary Arteries; Standing  -     Vital Signs Upon Arrival; Standing  -     Cardiac Monitoring; Standing  -     Verify NPO Status - Patient to Be NPO at Least 4 Hours Prior to CTA; Standing  -     Notify CT After Administration of metoprolol tartrate (LOPRESSOR) tablet; Standing  -     Notify Provider If Total Metoprolol Given Equals 300mg & Heart Rate Not At Goal; Standing  -     Notify Provider Prior to  Administration of Nitroglycerin if Patient SBP <80; Standing  -     POC Creatinine; Standing  -     Insert Peripheral IV; Standing  -     Saline Lock & Maintain IV Access; Standing  -     sodium chloride 0.9 % flush 10 mL  -     sodium chloride 0.9 % flush 10 mL  -     sodium chloride 0.9 % infusion 40 mL  -     Vital Signs - See Instructions; Standing  -     Hold Medication Metformin (Glucophage, Glucophage XR, Fortament, Glumetza);  Metglip (metformin/glipizide);  Glucovance (metformin/glyburide); Avandamet (metformin/rosiglitazone); Standing  -     Patient May Discharge Home After Procedure Complete (If Stable); Standing  -     metoprolol tartrate (LOPRESSOR) 50 MG tablet; Take 50 mg at Bedtime the Night Before Coronary CTA Appointment and In the Morning 1 Hour Prior to Coronary CTA Appointment  Dispense: 2 tablet; Refill: 0    3. Shortness of breath  Assessment & Plan:  Breathing appears to be much improved today.  Last visit in COPD exacerbation.  PFTs showing moderate obstruction and moderate reduction in DLCO.  Echo consistent with hypertensive heart disease without heart failure, normal ejection fraction mild LVH grade 1 diastolic dysfunction.  Stress test with nuclear imaging low risk probably normal. Plan:  Reinforced smoking cessation  Optimize blood pressure control by uptitrating amlodipine to 10 mg daily and emphasizing low-salt diet  Follow-up blood pressure control and shortness of breath       4. Pure hypercholesterolemia  Assessment & Plan:   Lipid abnormalities are improving with treatment    Plan:  Continue same medication/s without change.      Discussed medication dosage, use, side effects, and goals of treatment in detail.    Counseled patient on lifestyle modifications to help control hyperlipidemia.   Cholesterol lowering dietary information shared with patient.  Advised patient to exercise for 150 minutes weekly. (30 minute brisk walk, 5 days a week for example)  Weight Loss  encouraged  Stop tobacco use encouraged    Patient Treatment Goals:   LDL goal is less than 70  Awaiting repeat lipid profile as per primary care provider prior to changing Crestor dosing, versus adding a new medication.    Followup in 6 months.      5. Hypertension, essential  Assessment & Plan:  Hypertension is borderline, in office and at home readings.  Medication changes per orders.  Continue current treatment regimen.  Dietary sodium restriction.  Weight loss.  Regular aerobic exercise.  Stop smoking.  Ambulatory blood pressure monitoring.  Blood pressure will be reassessedin 6 months.      Other orders  -     omeprazole (priLOSEC) 20 MG capsule; Take 1 capsule by mouth Daily.  Dispense: 90 capsule; Refill: 3  -     amLODIPine (NORVASC) 10 MG tablet; Take 1 tablet by mouth Daily.  Dispense: 90 tablet; Refill: 3  -     ipratropium-albuterol (DUO-NEB) 0.5-2.5 mg/3 ml nebulizer; Take 3 mL by nebulization Every 4 (Four) Hours As Needed for Wheezing.  Dispense: 360 mL; Refill: 3         Return in about 6 months (around 2/7/2025) for Follow-up with Dr Holder.    There are no Patient Instructions on file for this visit.    Jamel Holder MD

## 2024-08-07 NOTE — ASSESSMENT & PLAN NOTE
Patient on Stiolto Respimat.  Prescribed nebulizers machine and solution for breakthrough symptoms, as patient reports severe symptoms when in exacerbation, and wants something to help with with that and prevent an emergency visit trip.

## 2024-08-12 ENCOUNTER — TELEPHONE (OUTPATIENT)
Dept: CARDIOLOGY | Facility: CLINIC | Age: 54
End: 2024-08-12
Payer: COMMERCIAL

## 2024-08-12 NOTE — TELEPHONE ENCOUNTER
PT CALLED REGARDING GETTING HIS SLEEP MACHINE     PHARMACY ABLE TO PROVIDE WHAT GOES INTO MACHINE    BUT PT IS UNABLE TO GET THE ACTUAL MACHINE     PLEASE ADVISE PT

## 2024-08-13 NOTE — TELEPHONE ENCOUNTER
Called patient regarding previous message. Patient reports Home Nebulizer order sent to [facility] pharmacy, oger unable to fill.   Home Neb order will be faxed to [facility]. Patient verbalized understanding and agreement.   Order faxed

## 2024-08-28 DIAGNOSIS — I10 HYPERTENSION, ESSENTIAL: ICD-10-CM

## 2024-08-28 NOTE — TELEPHONE ENCOUNTER
Rx Refill Note    Requested Prescriptions     Pending Prescriptions Disp Refills    propranolol LA (INDERAL LA) 60 MG 24 hr capsule [Pharmacy Med Name: PROPRANOLOL ER 60 MG CAPSULE] 90 capsule 0     Sig: TAKE 1 CAPSULE BY MOUTH DAILY        Last office visit with prescribing clinician: 2/21/2024      Next office visit with prescribing clinician: Visit date not found   Last labs:   Last refill: 02/21/2024   Pharmacy (be sure to add in Epic). correct

## 2024-08-29 RX ORDER — PROPRANOLOL HCL 60 MG
60 CAPSULE, EXTENDED RELEASE 24HR ORAL DAILY
Qty: 90 CAPSULE | Refills: 0 | Status: SHIPPED | OUTPATIENT
Start: 2024-08-29

## 2024-10-30 ENCOUNTER — TELEPHONE (OUTPATIENT)
Dept: INFUSION THERAPY | Facility: HOSPITAL | Age: 54
End: 2024-10-30
Payer: COMMERCIAL

## 2024-10-30 NOTE — TELEPHONE ENCOUNTER
Pt contacted as pre-procedure phone call prior to planned CTA coronary for 10-31. Reviewed with patient arrival time between [id number] to main registration, nothing to eat or drink by mouth 4 hours prior to arrival, no caffeine after midnight, please take premedications night before and morning of procedure with a small sip of water as instructed,  recommended,  reviewed procedure instructions and allowed time for questions, and reviewed home medications, allergies, and medical history.

## 2024-10-31 ENCOUNTER — HOSPITAL ENCOUNTER (OUTPATIENT)
Dept: CT IMAGING | Facility: HOSPITAL | Age: 54
Discharge: HOME | End: 2024-10-31
Payer: COMMERCIAL

## 2024-11-18 DIAGNOSIS — Z91.89 CHRONIC CHEST PAIN WITH HIGH RISK FOR CAD: ICD-10-CM

## 2024-11-18 DIAGNOSIS — R07.9 CHRONIC CHEST PAIN WITH HIGH RISK FOR CAD: ICD-10-CM

## 2024-11-18 DIAGNOSIS — G89.29 CHRONIC CHEST PAIN WITH HIGH RISK FOR CAD: ICD-10-CM

## 2024-11-18 RX ORDER — METOPROLOL TARTRATE 50 MG
TABLET ORAL
Qty: 2 TABLET | Refills: 0 | Status: SHIPPED | OUTPATIENT
Start: 2024-11-18

## 2024-11-18 NOTE — TELEPHONE ENCOUNTER
Caller: ERICK HILARIO    Relationship: Other    Best call back number: [phone]    Requested Prescriptions:   Requested Prescriptions     Pending Prescriptions Disp Refills    metoprolol tartrate (LOPRESSOR) 50 MG tablet 2 tablet 0     Sig: Take 50 mg at Bedtime the Night Before Coronary CTA Appointment and In the Morning 1 Hour Prior to Coronary CTA Appointment        Pharmacy where request should be sent: [facility] [id number] [place], [place] - [address] [facility] [address] [facility] [phone] [facility] [phone] FX     Last office visit with prescribing clinician: 8/7/2024   Last telemedicine visit with prescribing clinician: Visit date not found   Next office visit with prescribing clinician: 2/7/2025       Does the patient have less than a 3 day supply:  [] Yes  [x] No    Would you like a call back once the refill request has been completed: [x] Yes [] No    If the office needs to give you a call back, can they leave a voicemail: [] Yes [x] No    Hiram Montes Rep   11/18/24 15:24 EST

## 2024-12-11 DIAGNOSIS — I10 HYPERTENSION, ESSENTIAL: ICD-10-CM

## 2024-12-11 RX ORDER — PROPRANOLOL HYDROCHLORIDE 60 MG/1
60 CAPSULE, EXTENDED RELEASE ORAL DAILY
Qty: 90 CAPSULE | Refills: 0 | Status: SHIPPED | OUTPATIENT
Start: 2024-12-11

## 2024-12-12 ENCOUNTER — TELEPHONE (OUTPATIENT)
Facility: HOSPITAL | Age: 54
End: 2024-12-12
Payer: COMMERCIAL

## 2024-12-13 ENCOUNTER — HOSPITAL ENCOUNTER (OUTPATIENT)
Facility: HOSPITAL | Age: 54
Discharge: HOME | End: 2024-12-13
Payer: COMMERCIAL

## 2024-12-13 NOTE — TELEPHONE ENCOUNTER
Returned patient's call to give him the phone number to  Central scheduling. He said he has the number and will call to reschedule.

## 2024-12-13 NOTE — TELEPHONE ENCOUNTER
Caller: SD YIN ON [facility] FOR TEST RESULTS ONLY     Relationship: Emergency Contact    Best call back number: [phone] CALL PT     What is the best time to reach you: ANY     What was the call regarding: PT WAS TO HAVE A CT ANGIO DONE IN [place] THIS MORNING, PT WAS TOLD THE WRONG LOCATION, WAS UNABLE TO GET THE CT. NEEDS TO RESCHD THIS, PLEASE ADVISE.     Is it okay if the provider responds through MyChart: CALL

## 2025-01-01 ENCOUNTER — HOSPITAL ENCOUNTER (EMERGENCY)
Facility: HOSPITAL | Age: 55
Discharge: HOME | End: 2025-01-01
Attending: EMERGENCY MEDICINE
Payer: COMMERCIAL

## 2025-01-01 ENCOUNTER — APPOINTMENT (OUTPATIENT)
Dept: CT IMAGING | Facility: HOSPITAL | Age: 55
End: 2025-01-01
Payer: COMMERCIAL

## 2025-01-01 VITALS
SYSTOLIC BLOOD PRESSURE: 138 MMHG | HEART RATE: 56 BPM | WEIGHT: 190 LBS | TEMPERATURE: 97.7 F | HEIGHT: 69 IN | OXYGEN SATURATION: 96 % | DIASTOLIC BLOOD PRESSURE: 87 MMHG | BODY MASS INDEX: 28.14 KG/M2 | RESPIRATION RATE: 16 BRPM

## 2025-01-01 DIAGNOSIS — Z86.79 HISTORY OF HYPERTENSION: ICD-10-CM

## 2025-01-01 DIAGNOSIS — K42.9 UMBILICAL HERNIA WITHOUT OBSTRUCTION AND WITHOUT GANGRENE: ICD-10-CM

## 2025-01-01 DIAGNOSIS — R10.84 ACUTE GENERALIZED ABDOMINAL PAIN: Primary | ICD-10-CM

## 2025-01-01 LAB
ALBUMIN SERPL-MCNC: 4.6 G/DL (ref 3.5–5.2)
ALBUMIN/GLOB SERPL: 1.4 G/DL
ALP SERPL-CCNC: 84 U/L (ref 39–117)
ALT SERPL W P-5'-P-CCNC: 9 U/L (ref 1–41)
ANION GAP SERPL CALCULATED.3IONS-SCNC: 11 MMOL/L (ref 5–15)
AST SERPL-CCNC: 23 U/L (ref 1–40)
BASOPHILS # BLD AUTO: 0.11 10*3/MM3 (ref 0–0.2)
BASOPHILS NFR BLD AUTO: 1 % (ref 0–1.5)
BILIRUB SERPL-MCNC: 0.8 MG/DL (ref 0–1.2)
BILIRUB UR QL STRIP: NEGATIVE
BUN SERPL-MCNC: 12 MG/DL (ref 6–20)
BUN/CREAT SERPL: 14 (ref 7–25)
CALCIUM SPEC-SCNC: 9.9 MG/DL (ref 8.6–10.5)
CHLORIDE SERPL-SCNC: 102 MMOL/L (ref 98–107)
CLARITY UR: CLEAR
CO2 SERPL-SCNC: 26 MMOL/L (ref 22–29)
COLOR UR: YELLOW
CREAT BLDA-MCNC: 1.1 MG/DL (ref 0.6–1.3)
CREAT SERPL-MCNC: 0.86 MG/DL (ref 0.76–1.27)
DEPRECATED RDW RBC AUTO: 50 FL (ref 37–54)
EGFRCR SERPLBLD CKD-EPI 2021: 101.6 ML/MIN/1.73
EOSINOPHIL # BLD AUTO: 0.47 10*3/MM3 (ref 0–0.4)
EOSINOPHIL NFR BLD AUTO: 4.4 % (ref 0.3–6.2)
ERYTHROCYTE [DISTWIDTH] IN BLOOD BY AUTOMATED COUNT: 14.5 % (ref 12.3–15.4)
GLOBULIN UR ELPH-MCNC: 3.2 GM/DL
GLUCOSE SERPL-MCNC: 114 MG/DL (ref 65–99)
GLUCOSE UR STRIP-MCNC: NEGATIVE MG/DL
HCT VFR BLD AUTO: 47.9 % (ref 37.5–51)
HGB BLD-MCNC: 16.1 G/DL (ref 13–17.7)
HGB UR QL STRIP.AUTO: NEGATIVE
IMM GRANULOCYTES # BLD AUTO: 0.04 10*3/MM3 (ref 0–0.05)
IMM GRANULOCYTES NFR BLD AUTO: 0.4 % (ref 0–0.5)
KETONES UR QL STRIP: NEGATIVE
LEUKOCYTE ESTERASE UR QL STRIP.AUTO: NEGATIVE
LIPASE SERPL-CCNC: 42 U/L (ref 13–60)
LYMPHOCYTES # BLD AUTO: 2.93 10*3/MM3 (ref 0.7–3.1)
LYMPHOCYTES NFR BLD AUTO: 27.2 % (ref 19.6–45.3)
MCH RBC QN AUTO: 31.4 PG (ref 26.6–33)
MCHC RBC AUTO-ENTMCNC: 33.6 G/DL (ref 31.5–35.7)
MCV RBC AUTO: 93.6 FL (ref 79–97)
MONOCYTES # BLD AUTO: 0.94 10*3/MM3 (ref 0.1–0.9)
MONOCYTES NFR BLD AUTO: 8.7 % (ref 5–12)
NEUTROPHILS NFR BLD AUTO: 58.3 % (ref 42.7–76)
NEUTROPHILS NFR BLD AUTO: 6.3 10*3/MM3 (ref 1.7–7)
NITRITE UR QL STRIP: NEGATIVE
NRBC BLD AUTO-RTO: 0 /100 WBC (ref 0–0.2)
PH UR STRIP.AUTO: 6 [PH] (ref 5–8)
PLATELET # BLD AUTO: 276 10*3/MM3 (ref 140–450)
PMV BLD AUTO: 10.3 FL (ref 6–12)
POTASSIUM SERPL-SCNC: 4.2 MMOL/L (ref 3.5–5.2)
PROT SERPL-MCNC: 7.8 G/DL (ref 6–8.5)
PROT UR QL STRIP: NEGATIVE
RBC # BLD AUTO: 5.12 10*6/MM3 (ref 4.14–5.8)
SODIUM SERPL-SCNC: 139 MMOL/L (ref 136–145)
SP GR UR STRIP: <=1.005 (ref 1–1.03)
UROBILINOGEN UR QL STRIP: NORMAL
WBC NRBC COR # BLD AUTO: 10.79 10*3/MM3 (ref 3.4–10.8)

## 2025-01-01 PROCEDURE — 83690 ASSAY OF LIPASE: CPT | Performed by: EMERGENCY MEDICINE

## 2025-01-01 PROCEDURE — 81003 URINALYSIS AUTO W/O SCOPE: CPT | Performed by: EMERGENCY MEDICINE

## 2025-01-01 PROCEDURE — 25510000001 IOPAMIDOL 61 % SOLUTION: Performed by: EMERGENCY MEDICINE

## 2025-01-01 PROCEDURE — 99285 EMERGENCY DEPT VISIT HI MDM: CPT

## 2025-01-01 PROCEDURE — 82565 ASSAY OF CREATININE: CPT

## 2025-01-01 PROCEDURE — 74177 CT ABD & PELVIS W/CONTRAST: CPT

## 2025-01-01 PROCEDURE — 85025 COMPLETE CBC W/AUTO DIFF WBC: CPT | Performed by: EMERGENCY MEDICINE

## 2025-01-01 PROCEDURE — 80053 COMPREHEN METABOLIC PANEL: CPT | Performed by: EMERGENCY MEDICINE

## 2025-01-01 RX ORDER — IOPAMIDOL 612 MG/ML
85 INJECTION, SOLUTION INTRAVASCULAR
Status: COMPLETED | OUTPATIENT
Start: 2025-01-01 | End: 2025-01-01

## 2025-01-01 RX ADMIN — IOPAMIDOL 85 ML: 612 INJECTION, SOLUTION INTRAVENOUS at 13:41

## 2025-01-01 NOTE — ED PROVIDER NOTES
[facility]    EMERGENCY DEPARTMENT ENCOUNTER      Pt Name: Cecil Fermin  MRN: [id number]  YOB: 1970  Date of evaluation: 1/1/2025  Provider: Cj Evangelista MD    CHIEF COMPLAINT       Chief Complaint   Patient presents with    Abdominal Pain         HISTORY OF PRESENT ILLNESS   Cecil Fermin is a 54 y.o. male who presents to the emergency department with complaint of abdominal pain that began last night.  Patient had some pain around the umbilicus with intermittent sharp pain radiating outward when he coughs or twists a certain direction.  He has a known umbilical hernia and is concerned that it may be related to that.  He denies any nausea, vomiting, diarrhea, fever, chills, or urinary symptoms.  He has not seen a surgeon for his hernia.      Nursing notes were reviewed.    REVIEW OF SYSTEMS     ROS:  A chief complaint appropriate review of systems was completed and is negative except as noted in the HPI.      PAST MEDICAL HISTORY     Past Medical History:   Diagnosis Date    Alcoholism     Bronchitis     COPD (chronic obstructive pulmonary disease)     COVID-19     X2-has had shortness of breath since last infection    Hyperlipidemia     Hypertension     Peripheral vascular disease of extremity with claudication          SURGICAL HISTORY     No past surgical history on file.      CURRENT MEDICATIONS       Current Facility-Administered Medications:     ipratropium-albuterol (DUO-NEB) nebulizer solution 3 mL, 3 mL, Nebulization, 4x Daily - RT, Jamel Holder MD    nitroglycerin (NITROSTAT) SL tablet 0.4 mg, 0.4 mg, Sublingual, Q5 Min PRN, Jamel Holder MD    Current Outpatient Medications:     amLODIPine (NORVASC) 10 MG tablet, Take 1 tablet by mouth Daily., Disp: 90 tablet, Rfl: 3    aspirin 81 MG EC tablet, Take 1 tablet by mouth Daily., Disp: 90 tablet, Rfl: 3    azithromycin (Zithromax Z-Jair) 250 MG tablet, Take 2 tablets by mouth on day 1, then 1 tablet daily on days 2-5, Disp: 6  tablet, Rfl: 0    ipratropium-albuterol (DUO-NEB) 0.5-2.5 mg/3 ml nebulizer, Take 3 mL by nebulization Every 4 (Four) Hours As Needed for Wheezing., Disp: 360 mL, Rfl: 3    metoprolol tartrate (LOPRESSOR) 50 MG tablet, Take 50 mg at Bedtime the Night Before Coronary CTA Appointment and In the Morning 1 Hour Prior to Coronary CTA Appointment, Disp: 2 tablet, Rfl: 0    nitroglycerin (NITROSTAT) 0.4 MG SL tablet, 1 under the tongue as needed for angina, may repeat every 5 mins for up three doses.  If no resolution of chest pain, call 911 or head to the nearest emergency room., Disp: 100 tablet, Rfl: 11    omeprazole (priLOSEC) 20 MG capsule, Take 1 capsule by mouth Daily., Disp: 90 capsule, Rfl: 3    propranolol LA (INDERAL LA) 60 MG 24 hr capsule, Take 1 capsule by mouth Daily., Disp: 90 capsule, Rfl: 0    ramipril (Altace) 10 MG capsule, Take 1 capsule by mouth Daily., Disp: 90 capsule, Rfl: 3    rosuvastatin (CRESTOR) 20 MG tablet, Take 1 tablet by mouth Daily., Disp: 90 tablet, Rfl: 3    tiotropium bromide-olodaterol (Stiolto Respimat) 2.5-2.5 MCG/ACT aerosol solution inhaler, Inhale 2 puffs Daily. 3 inhalers, 3 month supply, Disp: 24 g, Rfl: 1    ALLERGIES     Patient has no known allergies.    FAMILY HISTORY       Family History   Problem Relation Age of Onset    Ovarian cancer Mother          age 59    Lung cancer Father          age 47    Lung cancer Sister     Ovarian cancer Sister     Pneumonia Brother          age 39 from COVID-pneumonia    Pneumonia Brother          age 56 from COVID-pneumonia    Pneumonia Maternal Grandmother          age 57 from COVID-pneumonia          SOCIAL HISTORY       Social History     Socioeconomic History    Marital status:    Tobacco Use    Smoking status: Every Day     Current packs/day: 3.00     Average packs/day: 3.0 packs/day for 43.0 years (129.0 ttl pk-yrs)     Types: Cigarettes     Start date:     Smokeless tobacco: Never   Vaping Use     "Vaping status: Never Used   Substance and Sexual Activity    Alcohol use: Not Currently     Comment: patient is a recovering alc- every now then    Drug use: Yes     Types: Marijuana    Sexual activity: Not Currently         PHYSICAL EXAM    (up to 7 for level 4, 8 or more for level 5)     Vitals:    01/01/25 1246   BP: 138/87   Patient Position: Sitting   Pulse: 56   Resp: 16   Temp: 97.7 °F (36.5 °C)   TempSrc: Oral   SpO2: 96%   Weight: 86.2 kg (190 lb)   Height: 175.3 cm (69\")       General: Awake, alert, no acute distress.  HEENT: Conjunctivae normal.  Neck: Trachea midline.  Cardiac: Heart regular rate, rhythm, no murmurs, rubs, or gallops  Lungs: Lungs are clear to auscultation, there is no wheezing, rhonchi, or rales. There is no use of accessory muscles.  Chest wall: There is no tenderness to palpation over the chest wall or over ribs  Abdomen: Abdomen is soft, nontender, nondistended. There are no firm or pulsatile masses, no rebound rigidity or guarding.   Musculoskeletal: No deformity.  Neuro: Alert and oriented x 4.  Dermatology: Skin is warm and dry  Psych: Mentation is grossly normal, cognition is grossly normal. Affect is appropriate.        DIAGNOSTIC RESULTS     EKG: All EKGs are interpreted by the Emergency Department Physician who either signs or Co-signs this chart in the absence of a cardiologist.    No orders to display         RADIOLOGY:   [x] Radiologist's Report Reviewed:  CT Abdomen Pelvis With Contrast   Final Result   Impression:   There are no labs or ER notes available for review at time of this dictation.      No acute abdominopelvic findings.      Punctate nonobstructing left renal calculus.            Electronically Signed: Tyler Costa MD     1/1/2025 2:04 PM EST     Workstation ID: [id number]          I ordered and independently reviewed the above noted radiographic studies.        LABS:    I have reviewed and interpreted all of the currently available lab results from this visit " (if applicable):  Results for orders placed or performed during the hospital encounter of 01/01/25   Comprehensive Metabolic Panel    Collection Time: 01/01/25  1:01 PM    Specimen: Blood   Result Value Ref Range    Glucose 114 (H) 65 - 99 mg/dL    BUN 12 6 - 20 mg/dL    Creatinine 0.86 0.76 - 1.27 mg/dL    Sodium 139 136 - 145 mmol/L    Potassium 4.2 3.5 - 5.2 mmol/L    Chloride 102 98 - 107 mmol/L    CO2 26.0 22.0 - 29.0 mmol/L    Calcium 9.9 8.6 - 10.5 mg/dL    Total Protein 7.8 6.0 - 8.5 g/dL    Albumin 4.6 3.5 - 5.2 g/dL    ALT (SGPT) 9 1 - 41 U/L    AST (SGOT) 23 1 - 40 U/L    Alkaline Phosphatase 84 39 - 117 U/L    Total Bilirubin 0.8 0.0 - 1.2 mg/dL    Globulin 3.2 gm/dL    A/G Ratio 1.4 g/dL    BUN/Creatinine Ratio 14.0 7.0 - 25.0    Anion Gap 11.0 5.0 - 15.0 mmol/L    eGFR 101.6 >60.0 mL/min/1.73   Lipase    Collection Time: 01/01/25  1:01 PM    Specimen: Blood   Result Value Ref Range    Lipase 42 13 - 60 U/L   CBC Auto Differential    Collection Time: 01/01/25  1:01 PM    Specimen: Blood   Result Value Ref Range    WBC 10.79 3.40 - 10.80 10*3/mm3    RBC 5.12 4.14 - 5.80 10*6/mm3    Hemoglobin 16.1 13.0 - 17.7 g/dL    Hematocrit 47.9 37.5 - 51.0 %    MCV 93.6 79.0 - 97.0 fL    MCH 31.4 26.6 - 33.0 pg    MCHC 33.6 31.5 - 35.7 g/dL    RDW 14.5 12.3 - 15.4 %    RDW-SD 50.0 37.0 - 54.0 fl    MPV 10.3 6.0 - 12.0 fL    Platelets 276 140 - 450 10*3/mm3    Neutrophil % 58.3 42.7 - 76.0 %    Lymphocyte % 27.2 19.6 - 45.3 %    Monocyte % 8.7 5.0 - 12.0 %    Eosinophil % 4.4 0.3 - 6.2 %    Basophil % 1.0 0.0 - 1.5 %    Immature Grans % 0.4 0.0 - 0.5 %    Neutrophils, Absolute 6.30 1.70 - 7.00 10*3/mm3    Lymphocytes, Absolute 2.93 0.70 - 3.10 10*3/mm3    Monocytes, Absolute 0.94 (H) 0.10 - 0.90 10*3/mm3    Eosinophils, Absolute 0.47 (H) 0.00 - 0.40 10*3/mm3    Basophils, Absolute 0.11 0.00 - 0.20 10*3/mm3    Immature Grans, Absolute 0.04 0.00 - 0.05 10*3/mm3    nRBC 0.0 0.0 - 0.2 /100 WBC   Urinalysis With  Microscopic If Indicated (No Culture) - Urine, Clean Catch    Collection Time: 01/01/25  1:02 PM    Specimen: Urine, Clean Catch   Result Value Ref Range    Color, UA Yellow Yellow, Straw    Appearance, UA Clear Clear    pH, UA 6.0 5.0 - 8.0    Specific Gravity, UA <=1.005 1.001 - 1.030    Glucose, UA Negative Negative    Ketones, UA Negative Negative    Bilirubin, UA Negative Negative    Blood, UA Negative Negative    Protein, UA Negative Negative    Leuk Esterase, UA Negative Negative    Nitrite, UA Negative Negative    Urobilinogen, UA 0.2 E.U./dL 0.2 - 1.0 E.U./dL   POC Creatinine    Collection Time: 01/01/25  1:10 PM    Specimen: Blood   Result Value Ref Range    Creatinine 1.10 0.60 - 1.30 mg/dL        If labs were ordered, I independently reviewed the results and considered them in treating the patient.      EMERGENCY DEPARTMENT COURSE and DIFFERENTIAL DIAGNOSIS/MDM:   Vitals:  AS OF 01:27 EST    BP - 138/87  HR - 56  TEMP - 97.7 °F (36.5 °C) (Oral)  O2 SATS - 96%        Discussion below represents my analysis of pertinent findings related to patient's condition, differential diagnosis, treatment plan and final disposition.      Differential diagnosis:  The differential diagnosis associated with the patient's presentation includes: Incarcerated umbilical hernia, strangulated umbilical hernia, AAA, mesenteric ischemia, diverticulitis, appendicitis      Independent interpretations (ECG/rhythm strip/X-ray/US/CT scan): I independently interpreted the patient's abdominal CT and cardiac monitor.  There is no evidence of obstructive change within the abdomen and the patient is in sinus rhythm.      Patient's care impacted by:   [] Diabetes   [x] Hypertension   [] Coronary Artery Disease   [] Cancer   [x] Other: Known umbilical hernia, history of alcohol abuse    Care significantly affected by Social Determinants of Health (housing and economic circumstances, unemployment)    [] Yes     [x] No   If yes, Patient's  care significantly limited by  Social Determinants of Health including:    [] Inadequate housing    [] Low income    [] Alcoholism and drug addiction in family    [] Problems related to primary support group    [] Unemployment    [] Problems related to employment    [] Other Social Determinants of Health:       ED Course:    ED Course as of 01/03/25 0127   Wed Jan 01, 2025   1530 Patient is asleep on reexamination.  I have examined his abdomen is soft and nontender.  I suspect his pain is likely related related to his umbilical hernia but there is no evidence of obstruction at this time.  No evidence of alternate concerning intra-abdominal/surgical pathology on imaging.  Laboratory evaluation benign without any evidence of significant leukocytosis, hepatic or pancreatic inflammation, urinary tract infection, or other significantly abnormal finding.  I feel the patient stable for discharge home at this point with outpatient follow-up [NS]      ED Course User Index  [NS] Cj Evangelista MD             I had a discussion with the patient/family regarding diagnosis, diagnostic results, treatment plan, and medications.  The patient/family indicated understanding of these instructions.  I spent adequate time at the bedside preceding discharge necessary to personally discuss the aftercare instructions, giving patient education, providing explanations of the results of our evaluations/findings, and my decision making to assure that the patient/family understand the plan of care.  Time was allotted to answer questions at that time and throughout the ED course.  Emphasis was placed on timely follow-up after discharge.  I also discussed the potential for the development of an acute emergent condition requiring further evaluation, admission, or even surgical intervention. I discussed that we found nothing during the visit today indicating the need for further workup, admission, or the presence of an unstable medical  condition.  I encouraged the patient to return to the emergency department immediately for ANY concerns, worsening, new complaints, or if symptoms persist and unable to seek follow-up in a timely fashion.  The patient/family expressed understanding and agreement with this plan.  The patient will follow-up with their PCP in 1-2 days for reevaluation.           PROCEDURES:  Procedures    CRITICAL CARE TIME        FINAL IMPRESSION      1. Acute generalized abdominal pain    2. Umbilical hernia without obstruction and without gangrene    3. History of hypertension          DISPOSITION/PLAN     ED Disposition       ED Disposition   Discharge    Condition   Stable    Comment   --                 Comment: Please note this report has been produced using speech recognition software.      Cj Evangelista MD  Attending Emergency Physician             Cj Evangelista MD  01/03/25 0128

## 2025-02-12 ENCOUNTER — TELEPHONE (OUTPATIENT)
Dept: CARDIOLOGY | Facility: CLINIC | Age: 55
End: 2025-02-12
Payer: COMMERCIAL

## 2025-02-12 NOTE — TELEPHONE ENCOUNTER
Caller: SD YIN    Relationship: Emergency Contact    Best call back number: [phone] (home)     What is the best time to reach you: ANYTIME    Who are you requesting to speak with (clinical staff, provider,  specific staff member): CLINICAL    What was the call regarding: PT'S WIFE SD IS ASKING FOR A CALL TO DISCUSS ALL PRESCRIBED MEDICATIONS FROM PROVIDER. PLEASE CALL HER WHEN AVAILABLE.

## 2025-03-13 ENCOUNTER — TELEPHONE (OUTPATIENT)
Dept: CARDIOLOGY | Facility: CLINIC | Age: 55
End: 2025-03-13
Payer: COMMERCIAL

## 2025-03-13 NOTE — TELEPHONE ENCOUNTER
Caller: Cecil Fermin     Relationship: SELF    Best call back number: [phone]    What is your medical concern? LEFT LEG GOING NUMB, AND FALLING OUT FROM UNDER PATIENT    How long has this issue been going on? ABOUT A MONTH    Is your provider already aware of this issue? NOT AWARE ISSUE HAS GOTTEN THIS BAD.    Have you been treated for this issue? NO

## 2025-03-14 NOTE — TELEPHONE ENCOUNTER
Attempted to call patient, answer but issue with phone, can't hear patient.   Ok for HUB to relay-   Please ask patient to hold rosuvastatin, and be seen ASAP by his primary care provider or in the emergency room setting since does not sound like a cardiac related condition.  Please schedule patient to be seen next available in cardiology since missed his follow-up appointment.  Thank you!

## 2025-03-14 NOTE — TELEPHONE ENCOUNTER
Please ask patient to hold rosuvastatin, and be seen ASAP by his primary care provider or in the emergency room setting since does not sound like a cardiac related condition.  Please schedule patient to be seen next available in cardiology since missed his follow-up appointment.  Thank you!

## 2025-03-17 NOTE — TELEPHONE ENCOUNTER
Attempted to contact patient again regarding message, no answer, no voicemail.   Ok for HUB to relay-   Please ask patient to hold rosuvastatin, and be seen ASAP by his primary care provider or in the emergency room setting since does not sound like a cardiac related condition.  Please schedule patient to be seen next available in cardiology since missed his follow-up appointment.  Thank you!